# Patient Record
Sex: FEMALE | Race: WHITE | Employment: FULL TIME | URBAN - METROPOLITAN AREA
[De-identification: names, ages, dates, MRNs, and addresses within clinical notes are randomized per-mention and may not be internally consistent; named-entity substitution may affect disease eponyms.]

---

## 2022-06-03 ENCOUNTER — HOSPITAL ENCOUNTER (OUTPATIENT)
Dept: GENERAL RADIOLOGY | Age: 32
Discharge: HOME | End: 2022-06-06

## 2022-06-03 DIAGNOSIS — T14.90XA INJURY: ICD-10-CM

## 2022-06-03 PROCEDURE — 73130 X-RAY EXAM OF HAND: CPT

## 2022-06-13 ENCOUNTER — OFFICE VISIT (OUTPATIENT)
Dept: ORTHOPEDIC SURGERY | Age: 32
End: 2022-06-13

## 2022-06-13 ENCOUNTER — TELEPHONE (OUTPATIENT)
Dept: ORTHOPEDIC SURGERY | Age: 32
End: 2022-06-13

## 2022-06-13 VITALS — BODY MASS INDEX: 22.15 KG/M2 | HEIGHT: 63 IN | WEIGHT: 125 LBS

## 2022-06-13 DIAGNOSIS — S62.662A CLOSED NONDISPLACED FRACTURE OF DISTAL PHALANX OF RIGHT MIDDLE FINGER, INITIAL ENCOUNTER: Primary | ICD-10-CM

## 2022-06-13 DIAGNOSIS — S62.664A NONDISPLACED FRACTURE OF DISTAL PHALANX OF RIGHT RING FINGER, INITIAL ENCOUNTER FOR CLOSED FRACTURE: ICD-10-CM

## 2022-06-13 DIAGNOSIS — L03.011 PARONYCHIA OF FINGER OF RIGHT HAND: ICD-10-CM

## 2022-06-13 RX ORDER — CEPHALEXIN 500 MG/1
500 CAPSULE ORAL 4 TIMES DAILY
Qty: 40 CAPSULE | Refills: 0 | Status: SHIPPED | OUTPATIENT
Start: 2022-06-13 | End: 2022-06-23

## 2022-06-13 NOTE — PROGRESS NOTES
Orthopaedic Hand Clinic Note    Name: Shana Mann  YOB: 1990  Gender: female  MRN: [id number]      CC: Patient referred for evaluation of upper extremity pain    HPI: Shana Mann is a 27 y.o. female with a chief complaint of right middle and ring finger pain after sustaining a crushing injury to the middle and ring fingers on 6/3/2022 at work. She was evaluated in urgent care, and had x-rays obtained. She had discontinue the use of her splints and went straight back to work, however on Saturday in the middle of the night she began having increasing pain in the ring finger, and noticed that there was pus coming out from underneath the nail. .      ROS/Meds/PSH/PMH/FH/SH: I personally reviewed the patients standard intake form. Pertinents are discussed in the HPI    Physical Examination:    Musculoskeletal Exam:  Examination on the right upper extremity demonstrates cap refill < 5 seconds in all fingers, skin is intact there is tenderness to palpation at the distal phalanx of the middle and ring fingers. There is no subungual hematoma at the middle finger. Nail plate is intact. On exam of the ring finger there is obvious purulence underneath the nail plate, proximal 05% of the nail plate, there is mild erythema of the paronychium; there is no fluctuance. Imaging / Electrodiagnostic Tests: Only reviewed and interpreted radiographs of the right hand. They demonstrate nondisplaced fractures of the distal phalanx bases of the middle and ring fingers    Assessment:     ICD-10-CM    1. Felon of finger of right hand  L03.011 cephALEXin (KEFLEX) 500 MG capsule   2. Closed nondisplaced fracture of distal phalanx of right middle finger, initial encounter  S62.662A    3. Nondisplaced fracture of distal phalanx of right ring finger, initial encounter for closed fracture  N93.455O        Plan:   We discussed the diagnosis and different treatment options.  We discussed observation, therapy, antiinflammatory medications and other pertinent treatment modalities. After discussing in detail the patient elects to proceed with nail plate removal to allow debridement of the purulence under her ring nail plate. Performed a digital block with 1% lidocaine without epinephrine. After allowing this to set up for several minutes and adequate anesthesia was obtained, under sterile conditions I removed the nail plate with blunt dissection. There was a small amount of purulence underneath it. The nail bed was in good condition. The area was irrigated and a sterile dressing was applied. I've given her prescription for Keflex. She is to perform soap and water washes 2-3 times daily. He is to avoid exposures to dirty water. She can perform finger motion as tolerated. I will see her back in 1 week for wound check. I discussed this with her Worker's Comp. nurse     Patient voiced accordance and understanding of the information provided and the formulated plan. All questions were answered to the patient's satisfaction during the encounter.       Hari Mike MD  Orthopaedic Surgery  06/13/22  5:53 PM

## 2022-06-13 NOTE — TELEPHONE ENCOUNTER
Nurse  calling to say her finger is draining and running a fever.  Feels like someone should check it today even though she has an appt tomoroow

## 2022-06-13 NOTE — LETTER
[address]  [facility] - [address] [address]  Phone: [phone]  Fax: [phone]          June 13, 2022     Patient: Young Belts   YOB: 1990   Date of Visit: 6/13/2022       To Whom It May Concern: It is my medical opinion that Young Belts may return to work with no exposure to water with the right hand for 1 week. If you have any questions or concerns, please don't hesitate to call.     Sincerely,        Richard Ledezma MD

## 2022-06-15 ENCOUNTER — TELEPHONE (OUTPATIENT)
Dept: ORTHOPEDIC SURGERY | Age: 32
End: 2022-06-15

## 2022-06-15 DIAGNOSIS — S62.662A CLOSED NONDISPLACED FRACTURE OF DISTAL PHALANX OF RIGHT MIDDLE FINGER, INITIAL ENCOUNTER: Primary | ICD-10-CM

## 2022-06-15 RX ORDER — IBUPROFEN 800 MG/1
800 TABLET ORAL 3 TIMES DAILY PRN
Qty: 21 TABLET | Refills: 0 | Status: SHIPPED | OUTPATIENT
Start: 2022-06-15 | End: 2022-06-15 | Stop reason: ALTCHOICE

## 2022-06-15 RX ORDER — NAPROXEN 500 MG/1
500 TABLET ORAL 2 TIMES DAILY PRN
Qty: 14 TABLET | Refills: 0 | Status: SHIPPED | OUTPATIENT
Start: 2022-06-15 | End: 2022-06-22

## 2022-06-15 NOTE — TELEPHONE ENCOUNTER
I left  for patient's mom stating Dr. Willie Nelson does not prescribe narcotic pain medication for this type of procedure. Per Dr. Willie Nelson she can have the naproxen that we will send in to her pharmacy or the ibuprofen 800 mg. Advised her to call me back with any questions.

## 2022-06-15 NOTE — TELEPHONE ENCOUNTER
Her mom is calling again. She was seen on Monday and had an infection in her wound so her fingernail was removed and the infection was scraped out. Please call her mom at [phone] as soon as possible. She is in a lot of pain and needs to discuss this.

## 2022-06-15 NOTE — TELEPHONE ENCOUNTER
I spoke with patient, she states nothing is helping her pain. She's tried ibuprofen, tylenol, ice, heat, elevating, etc.  States her finger is throbbing constantly. I advised her I would ask Dr. Chelsey Man and call her back. She voiced understanding.

## 2022-06-15 NOTE — TELEPHONE ENCOUNTER
Per Dr. Isaias sanders to send in ibuprofen 800 mg TID for 7 days. I notified patient's mother at patient's request.  Mother states she's been alternating 800 mg ibuprofen and tylenol consistently and it hasn't helped. I advised her I would contact Dr. Isaias Oliveira and call her back.

## 2022-06-16 ENCOUNTER — TELEPHONE (OUTPATIENT)
Dept: ORTHOPEDIC SURGERY | Age: 32
End: 2022-06-16

## 2022-06-16 ENCOUNTER — OFFICE VISIT (OUTPATIENT)
Dept: ORTHOPEDIC SURGERY | Age: 32
End: 2022-06-16

## 2022-06-16 DIAGNOSIS — S62.664A NONDISPLACED FRACTURE OF DISTAL PHALANX OF RIGHT RING FINGER, INITIAL ENCOUNTER FOR CLOSED FRACTURE: ICD-10-CM

## 2022-06-16 DIAGNOSIS — S62.662A CLOSED NONDISPLACED FRACTURE OF DISTAL PHALANX OF RIGHT MIDDLE FINGER, INITIAL ENCOUNTER: Primary | ICD-10-CM

## 2022-06-16 NOTE — TELEPHONE ENCOUNTER
I tried returning patient's phone call in regards to an appointment today but got no answer on the willie # listed and the mailbox is full and the cell number stated it is no longer in service.

## 2022-06-20 ENCOUNTER — OFFICE VISIT (OUTPATIENT)
Dept: ORTHOPEDIC SURGERY | Age: 32
End: 2022-06-20

## 2022-06-20 DIAGNOSIS — S62.662A CLOSED NONDISPLACED FRACTURE OF DISTAL PHALANX OF RIGHT MIDDLE FINGER, INITIAL ENCOUNTER: Primary | ICD-10-CM

## 2022-06-20 DIAGNOSIS — S62.664A NONDISPLACED FRACTURE OF DISTAL PHALANX OF RIGHT RING FINGER, INITIAL ENCOUNTER FOR CLOSED FRACTURE: ICD-10-CM

## 2022-06-20 NOTE — PROGRESS NOTES
Orthopaedic Hand Clinic Note    Name: Tato Lopes  YOB: 1990  Gender: female  MRN: [id number]      Follow up visit:   1. Closed nondisplaced fracture of distal phalanx of right middle finger, initial encounter    2. Nondisplaced fracture of distal phalanx of right ring finger, initial encounter for closed fracture        HPI: Tato Lopes is a 27 y.o. female who is following up for injuries to the right middle and ring finger. She states that the ring finger is still painful, but it is improving. She has been back at work, and has been avoiding exposure to water      ROS/Meds/PSH/PMH/FH/SH: I personally reviewed the patients standard intake form. Pertinents are discussed in the HPI    Physical Examination:    Musculoskeletal Examination:  Examination on the right upper extremity demonstrates cap refill < 5 seconds in all fingers, skin is intact, nail plate is intact at the middle finger, with no subungual hematoma. She has mild tenderness to palpation of the middle finger distal phalanx. She has moderate tenderness at the ring finger distal phalanx. Nail plate has been removed, and nailbed is in good condition. There is no active drainage. There is nosurrounding erythema. Imaging / Electrodiagnostic Tests:     none    Assessment:     ICD-10-CM    1. Closed nondisplaced fracture of distal phalanx of right middle finger, initial encounter  S62.662A    2. Nondisplaced fracture of distal phalanx of right ring finger, initial encounter for closed fracture  O82.220G        Plan:   We discussed the diagnosis and different treatment options. We discussed observation, therapy, antiinflammatory medications and other pertinent treatment modalities. After discussing in detail the patient elects to proceed with sterile anti-inflammatories or Tylenol as needed for pain. She can continue to work, but should avoid exposure to water at work for an additional week.   Following this, she can resume full duty with no restrictions. She will follow up in 3 months, or sooner if any worsening of symptoms. I discussed this with her Worker's Compensation nurse . Patient voiced accordance and understanding of the information provided and the formulated plan. All questions were answered to the patient's satisfaction during the encounter.       Jono Tan MD  Orthopaedic Surgery  06/20/22  12:29 PM

## 2022-06-20 NOTE — LETTER
[address] [address]  Phone: [phone]  Fax: [phone]    Carmenza Viveros MD        June 20, 2022     Patient: Tata Hopkins   YOB: 1990   Date of Visit: 6/20/2022       To Whom It May Concern: It is my medical opinion that Tata Hopkins may return to work with no exposure to water with the right hand for 1 week. Following that, she can resume all activities with no restrictions    If you have any questions or concerns, please don't hesitate to call.     Sincerely,        Carmenza Viveros MD

## 2022-09-13 ENCOUNTER — APPOINTMENT (OUTPATIENT)
Dept: ULTRASOUND IMAGING | Age: 32
End: 2022-09-13
Payer: MEDICAID

## 2022-09-13 ENCOUNTER — HOSPITAL ENCOUNTER (EMERGENCY)
Age: 32
Discharge: HOME | End: 2022-09-13
Attending: EMERGENCY MEDICINE
Payer: MEDICAID

## 2022-09-13 VITALS
HEART RATE: 74 BPM | DIASTOLIC BLOOD PRESSURE: 77 MMHG | WEIGHT: 126 LBS | BODY MASS INDEX: 22.32 KG/M2 | SYSTOLIC BLOOD PRESSURE: 120 MMHG | TEMPERATURE: 98.3 F | OXYGEN SATURATION: 99 % | HEIGHT: 63 IN | RESPIRATION RATE: 16 BRPM

## 2022-09-13 DIAGNOSIS — Z71.1 FEARED CONDITION NOT DEMONSTRATED: ICD-10-CM

## 2022-09-13 DIAGNOSIS — R10.31 ABDOMINAL PAIN, RIGHT LOWER QUADRANT: Primary | ICD-10-CM

## 2022-09-13 DIAGNOSIS — O23.42 URINARY TRACT INFECTION IN MOTHER DURING SECOND TRIMESTER OF PREGNANCY: ICD-10-CM

## 2022-09-13 LAB
ALBUMIN SERPL-MCNC: 3.7 G/DL (ref 3.5–5)
ALBUMIN/GLOB SERPL: 1.1 {RATIO}
ALP SERPL-CCNC: 65 U/L (ref 45–117)
ALT SERPL-CCNC: 11 U/L (ref 13–61)
ANION GAP SERPL CALC-SCNC: 10 MMOL/L (ref 7–16)
APPEARANCE UR: ABNORMAL
AST SERPL-CCNC: 14 U/L (ref 15–37)
BACTERIA URNS QL MICRO: ABNORMAL /HPF
BASOPHILS # BLD: 0 K/UL (ref 0–0.2)
BASOPHILS NFR BLD: 0 % (ref 0–2)
BILIRUB SERPL-MCNC: 0.2 MG/DL (ref 0.2–1.1)
BILIRUB UR QL: NEGATIVE
BUN SERPL-MCNC: 9 MG/DL (ref 7–18)
CALCIUM SERPL-MCNC: 8.8 MG/DL (ref 8.3–10.4)
CASTS URNS QL MICRO: 0 /LPF
CHLORIDE SERPL-SCNC: 106 MMOL/L (ref 98–107)
CO2 SERPL-SCNC: 25 MMOL/L (ref 21–32)
COLOR UR: YELLOW
CREAT SERPL-MCNC: 0.49 MG/DL (ref 0.6–1)
CRYSTALS URNS QL MICRO: 0 /LPF
DIFFERENTIAL METHOD BLD: ABNORMAL
EOSINOPHIL # BLD: 0.1 K/UL (ref 0–0.8)
EOSINOPHIL NFR BLD: 1 % (ref 0.5–7.8)
EPI CELLS #/AREA URNS HPF: ABNORMAL /HPF
ERYTHROCYTE [DISTWIDTH] IN BLOOD BY AUTOMATED COUNT: 12.4 % (ref 11.9–14.6)
GLOBULIN SER CALC-MCNC: 3.3 G/DL (ref 2.3–3.5)
GLUCOSE SERPL-MCNC: 87 MG/DL (ref 65–100)
GLUCOSE UR STRIP.AUTO-MCNC: NEGATIVE MG/DL
HCT VFR BLD AUTO: 35.8 % (ref 35.8–46.3)
HGB BLD-MCNC: 12.2 G/DL (ref 11.7–15.4)
HGB UR QL STRIP: NEGATIVE
IMM GRANULOCYTES # BLD AUTO: 0 K/UL (ref 0–0.5)
IMM GRANULOCYTES NFR BLD AUTO: 0 % (ref 0–5)
KETONES UR QL STRIP.AUTO: NEGATIVE MG/DL
LEUKOCYTE ESTERASE UR QL STRIP.AUTO: ABNORMAL
LIPASE SERPL-CCNC: 41 U/L (ref 13–60)
LYMPHOCYTES # BLD: 1.6 K/UL (ref 0.5–4.6)
LYMPHOCYTES NFR BLD: 17 % (ref 13–44)
MCH RBC QN AUTO: 31.9 PG (ref 26.1–32.9)
MCHC RBC AUTO-ENTMCNC: 34.1 G/DL (ref 31.4–35)
MCV RBC AUTO: 93.5 FL (ref 79.6–97.8)
MONOCYTES # BLD: 0.6 K/UL (ref 0.1–1.3)
MONOCYTES NFR BLD: 6 % (ref 4–12)
MUCOUS THREADS URNS QL MICRO: 0 /LPF
NEUTS SEG # BLD: 7.3 K/UL (ref 1.7–8.2)
NEUTS SEG NFR BLD: 77 % (ref 43–78)
NITRITE UR QL STRIP.AUTO: NEGATIVE
NRBC # BLD: 0 K/UL (ref 0–0.2)
OTHER OBSERVATIONS: ABNORMAL
PH UR STRIP: 5.5 [PH] (ref 5–9)
PLATELET # BLD AUTO: 291 K/UL (ref 150–450)
PMV BLD AUTO: 10.2 FL (ref 9.4–12.3)
POTASSIUM SERPL-SCNC: 3.9 MMOL/L (ref 3.5–5.1)
PROT SERPL-MCNC: 7 G/DL (ref 6.4–8.2)
PROT UR STRIP-MCNC: NEGATIVE MG/DL
RBC # BLD AUTO: 3.83 M/UL (ref 4.05–5.2)
RBC #/AREA URNS HPF: ABNORMAL /HPF
SODIUM SERPL-SCNC: 141 MMOL/L (ref 136–145)
SP GR UR REFRACTOMETRY: >=1.03 (ref 1–1.02)
UROBILINOGEN UR QL STRIP.AUTO: 0.2 EU/DL (ref 0.2–1)
WBC # BLD AUTO: 9.5 K/UL (ref 4.3–11.1)
WBC URNS QL MICRO: ABNORMAL /HPF

## 2022-09-13 PROCEDURE — 83690 ASSAY OF LIPASE: CPT

## 2022-09-13 PROCEDURE — 85025 COMPLETE CBC W/AUTO DIFF WBC: CPT

## 2022-09-13 PROCEDURE — 81003 URINALYSIS AUTO W/O SCOPE: CPT

## 2022-09-13 PROCEDURE — 81001 URINALYSIS AUTO W/SCOPE: CPT

## 2022-09-13 PROCEDURE — 80053 COMPREHEN METABOLIC PANEL: CPT

## 2022-09-13 PROCEDURE — 99283 EMERGENCY DEPT VISIT LOW MDM: CPT

## 2022-09-13 PROCEDURE — 76705 ECHO EXAM OF ABDOMEN: CPT

## 2022-09-13 PROCEDURE — 6370000000 HC RX 637 (ALT 250 FOR IP): Performed by: EMERGENCY MEDICINE

## 2022-09-13 RX ORDER — CYCLOBENZAPRINE HCL 10 MG
10 TABLET ORAL
Status: COMPLETED | OUTPATIENT
Start: 2022-09-13 | End: 2022-09-13

## 2022-09-13 RX ORDER — CYCLOBENZAPRINE HCL 5 MG
5 TABLET ORAL NIGHTLY PRN
Qty: 10 TABLET | Refills: 0 | Status: SHIPPED | OUTPATIENT
Start: 2022-09-13 | End: 2022-09-23

## 2022-09-13 RX ORDER — CEPHALEXIN 500 MG/1
500 CAPSULE ORAL 2 TIMES DAILY
Qty: 14 CAPSULE | Refills: 0 | Status: SHIPPED | OUTPATIENT
Start: 2022-09-13 | End: 2022-09-20

## 2022-09-13 RX ORDER — ACETAMINOPHEN 325 MG/1
650 TABLET ORAL
Status: COMPLETED | OUTPATIENT
Start: 2022-09-13 | End: 2022-09-13

## 2022-09-13 RX ADMIN — CYCLOBENZAPRINE 10 MG: 10 TABLET, FILM COATED ORAL at 17:29

## 2022-09-13 RX ADMIN — ACETAMINOPHEN 650 MG: 325 TABLET ORAL at 17:29

## 2022-09-13 ASSESSMENT — PAIN DESCRIPTION - ORIENTATION
ORIENTATION: LEFT;RIGHT;LOWER
ORIENTATION: RIGHT;LEFT;LOWER

## 2022-09-13 ASSESSMENT — PAIN DESCRIPTION - FREQUENCY
FREQUENCY: CONTINUOUS
FREQUENCY: CONTINUOUS

## 2022-09-13 ASSESSMENT — ENCOUNTER SYMPTOMS
NAUSEA: 1
ABDOMINAL PAIN: 1
VOMITING: 1

## 2022-09-13 ASSESSMENT — PAIN - FUNCTIONAL ASSESSMENT
PAIN_FUNCTIONAL_ASSESSMENT: 0-10
PAIN_FUNCTIONAL_ASSESSMENT: 0-10

## 2022-09-13 ASSESSMENT — PAIN SCALES - GENERAL
PAINLEVEL_OUTOF10: 5
PAINLEVEL_OUTOF10: 8
PAINLEVEL_OUTOF10: 5
PAINLEVEL_OUTOF10: 8
PAINLEVEL_OUTOF10: 8

## 2022-09-13 ASSESSMENT — PAIN DESCRIPTION - DESCRIPTORS
DESCRIPTORS: SHARP
DESCRIPTORS: ACHING
DESCRIPTORS: STABBING;SHARP

## 2022-09-13 ASSESSMENT — PAIN DESCRIPTION - PAIN TYPE
TYPE: ACUTE PAIN
TYPE: ACUTE PAIN

## 2022-09-13 ASSESSMENT — PAIN DESCRIPTION - LOCATION
LOCATION: ABDOMEN
LOCATION: ABDOMEN
LOCATION: ABDOMEN;BACK
LOCATION: ABDOMEN
LOCATION: ABDOMEN

## 2022-09-13 NOTE — ED PROVIDER NOTES
Emergency Department Provider Note                   PCP:                Irma Davis MD               Age: 27 y.o. Sex: female       ICD-10-CM    1. Abdominal pain, right lower quadrant  R10.31       2. Urinary tract infection in mother during second trimester of pregnancy  O23.42       3. Feared condition not demonstrated  Z71.1           DISPOSITION Decision To Discharge 09/13/2022 07:01:52 PM       MDM  Number of Diagnoses or Management Options  Abdominal pain, right lower quadrant  Feared condition not demonstrated  Urinary tract infection in mother during second trimester of pregnancy  Diagnosis management comments: Viral infection, gastroenteritis, viral adenitis, pseudomembranous colitis, inflammatory  bowel disease, infectious diarrhea    Abdominal wall pain,     Constipation, fecal impaction, small bowel obstruction, partial small bowel obstruction,  Ileus    gallbladder disease, cholecystitis, diverticulitis, appendicitis, appendicitis with rupture,    UTI, pyelonephritis, renal colic, ureteral stone     Peptic ulcer disease, esophagitis, GERD           Amount and/or Complexity of Data Reviewed  Clinical lab tests: ordered and reviewed  Tests in the radiology section of CPT®: ordered and reviewed  Tests in the medicine section of CPT®: reviewed and ordered  Review and summarize past medical records: yes  Independent visualization of images, tracings, or specimens: yes         Orders Placed This Encounter   Procedures    US ABDOMEN LIMITED Specify organ?  APPENDIX    CBC with Diff    CMP    Lipase    Urinalysis w rflx microscopic    Urinalysis, Micro    Diet NPO    Saline lock IV        Medications   cyclobenzaprine (FLEXERIL) tablet 10 mg (10 mg Oral Given 9/13/22 1729)   acetaminophen (TYLENOL) tablet 650 mg (650 mg Oral Given 9/13/22 1729)       New Prescriptions    CEPHALEXIN (KEFLEX) 500 MG CAPSULE    Take 1 capsule by mouth 2 times daily for 7 days    CYCLOBENZAPRINE (FLEXERIL) 5 MG TABLET    Take 1 tablet by mouth nightly as needed for Muscle spasms        Selvin Barlow is a 27 y.o. female who presents to the Emergency Department with chief complaint of    Chief Complaint   Patient presents with    Abdominal Pain      Patient is a 26-year-old female presenting to the emergency department today complaining of right lower quadrant abdominal pain which started on Thursday and progressively worsened over the last few days. Patient was seen and evaluated at her OBs office yesterday and reading her note it says that she thought that the pain was related to her pregnancy however the patient states that she was told to go to the emergency department at [address] for rule out appendicitis. Patient went but said that the wait was 12 hours and did not want to stay there that long and decided to leave. The pain has continued and she decided to come here today for further evaluation. She has had nausea increased over the last few days and is taking Zofran and Phenergan with control of her symptoms. All other systems reviewed and are negative unless otherwise stated in the history of present illness section. Review of Systems   Gastrointestinal:  Positive for abdominal pain, nausea and vomiting. All other systems reviewed and are negative. Past Medical History:   Diagnosis Date    Anxiety     Crohn disease ([facility] [address]     Depression     Headache         Past Surgical History:   Procedure Laterality Date     SECTION      CHOLECYSTECTOMY      TONSILLECTOMY          History reviewed. No pertinent family history.      Social History     Socioeconomic History    Marital status: Single     Spouse name: None    Number of children: None    Years of education: None    Highest education level: None   Tobacco Use    Smoking status: Never    Smokeless tobacco: Never   Vaping Use    Vaping Use: Never used   Substance and Sexual Activity    Alcohol use: Not Currently    Drug use: Never Allergies: Ciprofloxacin, Aloe vera, Clindamycin, and Prednisone    Previous Medications    ACETAMINOPHEN (TYLENOL) 325 MG TABLET    Take 1 or 2 tablets every 4 hours as needed for mild pain    NAPROXEN (NAPROSYN) 500 MG TABLET    Take 1 tablet by mouth 2 times daily as needed for Pain    SUMATRIPTAN (IMITREX) 100 MG TABLET    once as needed         Vitals signs and nursing note reviewed. Patient Vitals for the past 4 hrs:   Temp Pulse Resp BP SpO2   09/13/22 1702 98.4 °F (36.9 °C) 83 20 117/79 99 %          Physical Exam     GENERAL:The patient has Body mass index is 22.32 kg/m². Well-hydrated. VITAL SIGNS: Heart rate, blood pressure, respiratory rate reviewed as recorded in  nurse's notes  EYES: Pupils reactive. Extraocular motion intact. No conjunctival redness or drainage. MOUTH/THROAT: Pharynx clear; airway patent. NECK: Supple, no meningeal signs. Trachea midline. No masses or thyromegaly. LUNGS: Breath sounds clear and equal bilaterally no accessory muscle use. CHEST: No deformity  CARDIOVASCULAR: Regular rate and rhythm  ABDOMEN: Soft with right lower quadrant tenderness. Positive heeltap and psoas sign. No palpable masses or organomegaly. No peritoneal signs. No rigidity. Negative Megan Nasuti, McBurney and Rovsing signs. EXTREMITIES: No clubbing or cyanosis. No joint swelling. Normal muscle tone. No  restricted range of motion appreciated. NEUROLOGIC: Sensation is grossly intact. Cranial nerve exam reveals face is  symmetrical, tongue is midline speech is clear. SKIN: No rash or petechiae. Good skin turgor palpated. PSYCHIATRIC: Alert and oriented. Appropriate behavior and judgment. Procedures      Results for orders placed or performed during the hospital encounter of 09/13/22    [address] organ? APPENDIX    Narrative    LIMITED ABDOMINAL ULTRASOUND 9/13/2022    HISTORY: Pregnant patient with right lower quadrant pain.     TECHNIQUE: Sonographic imaging of the right lower quadrant was performed. FINDINGS: The appendix is not visible. There is no visible free fluid in the  imaged portion of the right lower abdomen. A fetal heart rate measures 151 bpm.      Impression    This is equivocal for evaluation of appendicitis in the absence of  an identifiable appendix in the right lower quadrant.    CBC with Diff   Result Value Ref Range    WBC 9.5 4.3 - 11.1 K/uL    RBC 3.83 (L) 4.05 - 5.20 M/uL    Hemoglobin 12.2 11.7 - 15.4 g/dL    Hematocrit 35.8 35.8 - 46.3 %    MCV 93.5 79.6 - 97.8 FL    MCH 31.9 26.1 - 32.9 PG    MCHC 34.1 31.4 - 35.0 g/dL    RDW 12.4 11.9 - 14.6 %    Platelets 632 773 - 882 K/uL    MPV 10.2 9.4 - 12.3 FL    nRBC 0.00 0.0 - 0.2 K/uL    Differential Type AUTOMATED      Seg Neutrophils 77 43 - 78 %    Lymphocytes 17 13 - 44 %    Monocytes 6 4.0 - 12.0 %    Eosinophils % 1 0.5 - 7.8 %    Basophils 0 0.0 - 2.0 %    Immature Granulocytes 0 0.0 - 5.0 %    Segs Absolute 7.3 1.7 - 8.2 K/UL    Absolute Lymph # 1.6 0.5 - 4.6 K/UL    Absolute Mono # 0.6 0.1 - 1.3 K/UL    Absolute Eos # 0.1 0.0 - 0.8 K/UL    Basophils Absolute 0.0 0.0 - 0.2 K/UL    Absolute Immature Granulocyte 0.0 0.0 - 0.5 K/UL   CMP   Result Value Ref Range    Sodium 141 136 - 145 mmol/L    Potassium 3.9 3.5 - 5.1 mmol/L    Chloride 106 98 - 107 mmol/L    CO2 25 21 - 32 mmol/L    Anion Gap 10 7.0 - 16.0 mmol/L    Glucose 87 65 - 100 mg/dL    BUN 9 7.0 - 18.0 MG/DL    Creatinine 0.49 (L) 0.6 - 1.0 MG/DL    GFR African American >186 >60 ml/min/1.73m2    GFR Non- >60 >60 ml/min/1.73m2    Calcium 8.8 8.3 - 10.4 MG/DL    Total Bilirubin 0.2 0.2 - 1.1 MG/DL    ALT 11 (L) 13.0 - 61.0 U/L    AST 14 (L) 15 - 37 U/L    Alk Phosphatase 65 45.0 - 117.0 U/L    Total Protein 7.0 6.4 - 8.2 g/dL    Albumin 3.7 3.5 - 5.0 g/dL    Globulin 3.3 2.3 - 3.5 g/dL    Albumin/Globulin Ratio 1.1     Lipase   Result Value Ref Range    Lipase 41 13 - 60 U/L   Urinalysis w rflx microscopic   Result Value Ref Range Color, UA YELLOW      Appearance SLIGHTLY CLOUDY      Specific Gravity, UA >=1.030 1.001 - 1.023    pH, Urine 5.5 5.0 - 9.0      Protein, UA Negative NEG mg/dL    Glucose, UA Negative mg/dL    Ketones, Urine Negative NEG mg/dL    Bilirubin Urine Negative NEG      Blood, Urine Negative NEG      Urobilinogen, Urine 0.2 0.2 - 1.0 EU/dL    Nitrite, Urine Negative NEG      Leukocyte Esterase, Urine TRACE (A) NEG     Urinalysis, Micro   Result Value Ref Range    WBC, UA 20-50 0 /hpf    RBC, UA 3-5 0 /hpf    Epithelial Cells UA 0-3 0 /hpf    BACTERIA, URINE 1+ (H) 0 /hpf    Casts 0 0 /lpf    Crystals 0 0 /LPF    Mucus, UA 0 0 /lpf    OTHER OBSERVATIONS RESULTS VERIFIED MANUALLY          US ABDOMEN LIMITED Specify organ? APPENDIX   Final Result   This is equivocal for evaluation of appendicitis in the absence of   an identifiable appendix in the right lower quadrant. ED Course as of 09/13/22 1903   Tue Sep 13, 2022   [facility] organ? APPENDIX  IMPRESSION:  This is equivocal for evaluation of appendicitis in the absence of  an identifiable appendix in the right lower quadrant. [KH]      ED Course User Index  [KH] Monae Lovett DO        Voice dictation software was used during the making of this note. This software is not perfect and grammatical and other typographical errors may be present. This note has not been completely proofread for errors.        Monae Lovett DO  09/13/22 1903

## 2022-09-13 NOTE — DISCHARGE INSTRUCTIONS
Take the full course of antibiotics as prescribed. Take the Flexeril at night before going to bed and use Tylenol as needed for mild to moderate pain control. Follow-up with your OB in 2 to 3 days for repeat evaluation.   If you develop new or concerning symptoms return to the ER at that time

## 2022-09-13 NOTE — ED NOTES
I have reviewed discharge instructions with the patient. The patient verbalized understanding. Patient left ED via Discharge Method: ambulatory to Home with self. Opportunity for questions and clarification provided. Patient given 2 scripts. Work note provided    To continue your aftercare when you leave the hospital, you may receive an automated call from our care team to check in on how you are doing. This is a free service and part of our promise to provide the best care and service to meet your aftercare needs.  If you have questions, or wish to unsubscribe from this service please call [phone]. Thank you for Choosing our [facility] Emergency Department.       Sena Galindo RN  09/13/22 5490

## 2022-09-13 NOTE — ED TRIAGE NOTES
Patient states he has Lower abd pain and left flank pain. Started on Saturday. C/o N/V, denies diarrhea, fever. Patient states she went to her OBGYN and they sent her to [address] [place] [address] thinking she had appendicitis. Patient reports the wait was too long so she came her. Denies dysuria, Urgency, frequency. Abd is tender.

## 2022-10-31 ENCOUNTER — OFFICE VISIT (OUTPATIENT)
Dept: ORTHOPEDIC SURGERY | Age: 32
End: 2022-10-31

## 2022-10-31 VITALS — WEIGHT: 130 LBS | BODY MASS INDEX: 23.04 KG/M2 | HEIGHT: 63 IN

## 2022-10-31 DIAGNOSIS — S62.662A CLOSED NONDISPLACED FRACTURE OF DISTAL PHALANX OF RIGHT MIDDLE FINGER, INITIAL ENCOUNTER: Primary | ICD-10-CM

## 2022-10-31 DIAGNOSIS — S62.664A NONDISPLACED FRACTURE OF DISTAL PHALANX OF RIGHT RING FINGER, INITIAL ENCOUNTER FOR CLOSED FRACTURE: ICD-10-CM

## 2022-10-31 NOTE — LETTER
[address] [address]  Phone: [phone]  Fax: [phone]    Kalen Motley MD        October 31, 2022     Patient: Carl Berg   YOB: 1990   Date of Visit: 10/31/2022       To Whom It May Concern: It is my medical opinion that Carl Berg may return to full duty immediately with no restrictions. She has reached maximum medical improvement    If you have any questions or concerns, please don't hesitate to call.     Sincerely,        Kalen Motley MD

## 2022-10-31 NOTE — PROGRESS NOTES
Orthopaedic Hand Clinic Note    Name: Juan Luis Ríos  YOB: 1990  Gender: female  MRN: [id number]      Follow up visit:   1. Closed nondisplaced fracture of distal phalanx of right middle finger, initial encounter    2. Nondisplaced fracture of distal phalanx of right ring finger, initial encounter for closed fracture        HPI: JuanL uis Ríos is a 27 y.o. female who is following up for injury to her right middle and ring finger. She has no pain, and no difficulty using the fingers. She has no new complaints today. ROS/Meds/PSH/PMH/FH/SH: I personally reviewed the patients standard intake form. Pertinents are discussed in the HPI    Physical Examination:    Musculoskeletal Examination:  Examination on the right upper extremity demonstrates cap refill < 5 seconds in all fingers, skin is intact, there is no soft tissue swelling. Nail plates are intact without deformity. She is able to perform full extension at the middle and ring distal interphalangeal joints, and she is able to make a composite fist to distal palmar crease. She has no tenderness to palpation or pain with range of motion. Light touch sensation is intact throughout. Imaging / Electrodiagnostic Tests:     none    Assessment:     ICD-10-CM    1. Closed nondisplaced fracture of distal phalanx of right middle finger, initial encounter  S62.662A       2. Nondisplaced fracture of distal phalanx of right ring finger, initial encounter for closed fracture  Q61.992I           Plan:   We discussed the diagnosis and different treatment options. We discussed observation, therapy, antiinflammatory medications and other pertinent treatment modalities. After discussing in detail the patient elects to proceed with continue all activities as tolerated. I have no activity restrictions for her. She has achieved maximal medical improvement. . I have discussed this with her medical case manager, Sherren Razor, who is here with her today    Patient voiced accordance and understanding of the information provided and the formulated plan. All questions were answered to the patient's satisfaction during the encounter.       Ashley Waddell MD  Orthopaedic Surgery  10/31/22  11:03 AM

## 2022-11-18 ENCOUNTER — CLINICAL DOCUMENTATION (OUTPATIENT)
Dept: ORTHOPEDIC SURGERY | Age: 32
End: 2022-11-18

## 2022-12-20 ENCOUNTER — HOSPITAL ENCOUNTER (EMERGENCY)
Age: 32
Discharge: ANOTHER HOSPITAL | End: 2022-12-20
Attending: EMERGENCY MEDICINE
Payer: MEDICAID

## 2022-12-20 VITALS
RESPIRATION RATE: 18 BRPM | BODY MASS INDEX: 23.03 KG/M2 | SYSTOLIC BLOOD PRESSURE: 115 MMHG | DIASTOLIC BLOOD PRESSURE: 73 MMHG | OXYGEN SATURATION: 99 % | HEIGHT: 63 IN | HEART RATE: 103 BPM | TEMPERATURE: 97.6 F

## 2022-12-20 DIAGNOSIS — U07.1 COVID: Primary | ICD-10-CM

## 2022-12-20 DIAGNOSIS — R10.2 PELVIC PRESSURE IN PREGNANCY: ICD-10-CM

## 2022-12-20 DIAGNOSIS — O26.899 PELVIC PRESSURE IN PREGNANCY: ICD-10-CM

## 2022-12-20 LAB
ALBUMIN SERPL-MCNC: 3.5 G/DL (ref 3.5–5)
ALBUMIN/GLOB SERPL: 1.1 {RATIO} (ref 0.4–1.6)
ALP SERPL-CCNC: 84 U/L (ref 45–117)
ALT SERPL-CCNC: 15 U/L (ref 13–61)
ANION GAP SERPL CALC-SCNC: 12 MMOL/L (ref 2–11)
AST SERPL-CCNC: 18 U/L (ref 15–37)
BASOPHILS # BLD: 0 K/UL (ref 0–0.2)
BASOPHILS NFR BLD: 0 % (ref 0–2)
BILIRUB SERPL-MCNC: 0.2 MG/DL (ref 0.2–1.1)
BUN SERPL-MCNC: 5 MG/DL (ref 7–18)
CALCIUM SERPL-MCNC: 8.8 MG/DL (ref 8.3–10.4)
CHLORIDE SERPL-SCNC: 101 MMOL/L (ref 98–107)
CO2 SERPL-SCNC: 23 MMOL/L (ref 21–32)
CREAT SERPL-MCNC: 0.51 MG/DL (ref 0.6–1)
DIFFERENTIAL METHOD BLD: ABNORMAL
EOSINOPHIL # BLD: 0 K/UL (ref 0–0.8)
EOSINOPHIL NFR BLD: 1 % (ref 0.5–7.8)
ERYTHROCYTE [DISTWIDTH] IN BLOOD BY AUTOMATED COUNT: 12 % (ref 11.9–14.6)
FLUAV AG NPH QL IA: NEGATIVE
FLUBV AG NPH QL IA: NEGATIVE
GLOBULIN SER CALC-MCNC: 3.1 G/DL (ref 2.8–4.5)
GLUCOSE SERPL-MCNC: 94 MG/DL (ref 65–100)
HCT VFR BLD AUTO: 32.4 % (ref 35.8–46.3)
HGB BLD-MCNC: 10.9 G/DL (ref 11.7–15.4)
IMM GRANULOCYTES # BLD AUTO: 0 K/UL (ref 0–0.5)
IMM GRANULOCYTES NFR BLD AUTO: 0 % (ref 0–5)
LYMPHOCYTES # BLD: 0.3 K/UL (ref 0.5–4.6)
LYMPHOCYTES NFR BLD: 5 % (ref 13–44)
MCH RBC QN AUTO: 31.1 PG (ref 26.1–32.9)
MCHC RBC AUTO-ENTMCNC: 33.6 G/DL (ref 31.4–35)
MCV RBC AUTO: 92.3 FL (ref 82–102)
MONOCYTES # BLD: 0.4 K/UL (ref 0.1–1.3)
MONOCYTES NFR BLD: 7 % (ref 4–12)
NEUTS SEG # BLD: 4.9 K/UL (ref 1.7–8.2)
NEUTS SEG NFR BLD: 87 % (ref 43–78)
NRBC # BLD: 0 K/UL (ref 0–0.2)
PLATELET # BLD AUTO: 195 K/UL (ref 150–450)
PMV BLD AUTO: 10.2 FL (ref 9.4–12.3)
POTASSIUM SERPL-SCNC: 3.5 MMOL/L (ref 3.5–5.1)
PROT SERPL-MCNC: 6.6 G/DL (ref 6.4–8.2)
RBC # BLD AUTO: 3.51 M/UL (ref 4.05–5.2)
SARS-COV-2 RDRP RESP QL NAA+PROBE: DETECTED
SODIUM SERPL-SCNC: 136 MMOL/L (ref 133–143)
SOURCE: ABNORMAL
SPECIMEN SOURCE: NORMAL
WBC # BLD AUTO: 5.6 K/UL (ref 4.3–11.1)

## 2022-12-20 PROCEDURE — 87635 SARS-COV-2 COVID-19 AMP PRB: CPT

## 2022-12-20 PROCEDURE — 2580000003 HC RX 258: Performed by: EMERGENCY MEDICINE

## 2022-12-20 PROCEDURE — 80053 COMPREHEN METABOLIC PANEL: CPT

## 2022-12-20 PROCEDURE — 85025 COMPLETE CBC W/AUTO DIFF WBC: CPT

## 2022-12-20 PROCEDURE — 87804 INFLUENZA ASSAY W/OPTIC: CPT

## 2022-12-20 PROCEDURE — 99285 EMERGENCY DEPT VISIT HI MDM: CPT

## 2022-12-20 PROCEDURE — 96360 HYDRATION IV INFUSION INIT: CPT

## 2022-12-20 RX ORDER — 0.9 % SODIUM CHLORIDE 0.9 %
1000 INTRAVENOUS SOLUTION INTRAVENOUS ONCE
Status: COMPLETED | OUTPATIENT
Start: 2022-12-20 | End: 2022-12-20

## 2022-12-20 RX ADMIN — SODIUM CHLORIDE 1000 ML: 9 INJECTION, SOLUTION INTRAVENOUS at 11:37

## 2022-12-20 ASSESSMENT — ENCOUNTER SYMPTOMS
RHINORRHEA: 0
VOMITING: 1
ABDOMINAL PAIN: 0
DIARRHEA: 0
WHEEZING: 0
NAUSEA: 1
SORE THROAT: 0
SHORTNESS OF BREATH: 0
COUGH: 0

## 2022-12-20 ASSESSMENT — PAIN SCALES - GENERAL: PAINLEVEL_OUTOF10: 3

## 2022-12-20 ASSESSMENT — PAIN - FUNCTIONAL ASSESSMENT: PAIN_FUNCTIONAL_ASSESSMENT: 0-10

## 2022-12-20 ASSESSMENT — PAIN DESCRIPTION - DESCRIPTORS: DESCRIPTORS: ACHING

## 2022-12-20 NOTE — ED TRIAGE NOTES
Pt states she has had body aches, headache, fatigue since yesterday, also feels like she is having \"contractions every 45 mins since last night, alsting about 1 min. \" Denies vaginal bleeding, nausea vomiting last night. This is her second pregnancy, had twins at 27 weeks with last pregnancy.

## 2022-12-20 NOTE — ED NOTES
TRANSFER - OUT REPORT:    Verbal report given to Darcy MCGUIRE on Carl Berg  being transferred to [facility]  for routine progression of patient care       Report consisted of patient's Situation, Background, Assessment and   Recommendations(SBAR). Information from the following report(s) ED SBAR was reviewed with the receiving nurse. Lines:   Peripheral IV 12/20/22 Right Antecubital (Active)   Site Assessment Clean, dry & intact 12/20/22 1114   Line Status Blood return noted; Flushed;Normal saline locked 12/20/22 1114   Dressing Status New dressing applied 12/20/22 1114        Opportunity for questions and clarification was provided.       Patient transported with:  [facility]       Glenroy Gunn RN  12/20/22 5830

## 2022-12-20 NOTE — ED NOTES
Spoke to [facility] transfer [facility] regarding transferring to [address] [place] [address]. OB was paged and will call hospital main line to speak with Dr. Gabriella Ocampo.       Maryse Clark RN  12/20/22 1966

## 2022-12-20 NOTE — ED PROVIDER NOTES
Emergency Department Provider Note                   PCP:                Soumya Us MD               Age: 27 y.o. Sex: female       ICD-10-CM    1. COVID  U07.1       2. Pelvic pressure in pregnancy  O26.899     R10.2           DISPOSITION Decision To Transfer 12/20/2022 11:53:54 AM        MDM  Number of Diagnoses or Management Options  COVID  Pelvic pressure in pregnancy  Diagnosis management comments: Patient is a 75-year-old female G2, P2 with twin pregnancies who is 27 weeks OB. Patient's positive for flu and likely is causing her whole body aches and muscle aches and increase nausea vomiting over baseline. Patient states she has had intermittent cramping that lasts 1 minute at a time every 45 minutes or so and she is had that for the last several weeks but for the last week she has had increased pressure in the pubic area. Patient's physical exam is unremarkable and cervical os is closed no effacement. We have spoken to Dr. Naomy Gallegos team at [facility] as the patient is followed at [facility] and they have accepted the patient in transfer. Amount and/or Complexity of Data Reviewed  Clinical lab tests: ordered and reviewed  Tests in the radiology section of CPT®: ordered and reviewed  Review and summarize past medical records: yes  Independent visualization of images, tracings, or specimens: yes    Risk of Complications, Morbidity, and/or Mortality  Presenting problems: moderate  Diagnostic procedures: moderate  Management options: moderate       Complexity of Problem: 1 acute, uncomplicated illness or injury. (3)    I have conducted an independent ordering and review of Labs. Considerations: Shared decision making was utilized in the care of this patient. We discussed care recommended by provider that patient refuses (tests, admit, disposition, etc). Patient was discharged risks and benefits of hospitalization were discussed.    ED Course as of 12/20/22 1228   Tue Dec 20, 2022   1225 I spoke with the transfer center and they indicated Dr. Yousuf Cali has accepted the patient from [facility] and that Dr. Kaia Stevens was aware of the patient prior to their call. They also indicated that Dr. Yousuf Cali did not wish or require us to speak directly with them for acceptance. [SH]      ED Course User Index  [SH] Henry Simon MD        Orders Placed This Encounter   Procedures    COVID-19, Rapid    Rapid influenza A/B antigens    US PELVIS LIMITED    CBC with Auto Differential    CMP    Assess fetal heart tones        Medications   0.9 % sodium chloride bolus (1,000 mLs IntraVENous New Bag 12/20/22 1137)       New Prescriptions    No medications on file        Bonnie Barrera is a 27 y.o. female who presents to the Emergency Department with chief complaint of    Chief Complaint   Patient presents with    Contractions    Generalized Body Aches             Patient is a G2, P2 at 27-week OB the first pregnancy with twins who presents specifically with new complaint of whole body ache as well as increase in her nausea vomiting that she has had during her entire pregnancy. Patient states she has had abdominal contractions that lasts approximately 1 minute and occurs every 45 minutes or so for the last 7 weeks of her pregnancy and she has been followed by her OB for this and this is not a new presentation or complaint. Patient denies any vaginal bleeding or discharge and denies any pelvic pressure. Patient denies any vaginal leakage or rupture of membranes. Patient denies any fevers or chills. The history is provided by the patient.    Generalized Body Aches  Severity:  Mild  Onset quality:  Gradual  Duration:  2 days  Timing:  Intermittent  Progression:  Waxing and waning  Chronicity:  New  Associated symptoms: nausea and vomiting    Associated symptoms: no abdominal pain, no chest pain, no congestion, no cough, no diarrhea, no fever, no headaches, no loss of consciousness, no myalgias, no rash, no rhinorrhea, no shortness of breath, no sore throat and no wheezing        Review of Systems   Constitutional:  Negative for fever. HENT:  Negative for congestion, rhinorrhea and sore throat. Respiratory:  Negative for cough, shortness of breath and wheezing. Cardiovascular:  Negative for chest pain. Gastrointestinal:  Positive for nausea and vomiting. Negative for abdominal pain and diarrhea. Musculoskeletal:  Negative for myalgias. Skin:  Negative for rash. Neurological:  Negative for loss of consciousness and headaches. All other systems reviewed and are negative. Past Medical History:   Diagnosis Date    Anxiety     Crohn disease ([address]     Depression     Headache         Past Surgical History:   Procedure Laterality Date     SECTION      CHOLECYSTECTOMY      TONSILLECTOMY          History reviewed. No pertinent family history. Social History     Socioeconomic History    Marital status: Single     Spouse name: None    Number of children: None    Years of education: None    Highest education level: None   Tobacco Use    Smoking status: Never    Smokeless tobacco: Never   Vaping Use    Vaping Use: Never used   Substance and Sexual Activity    Alcohol use: Not Currently    Drug use: Never         Ciprofloxacin, Aloe vera, Clindamycin, and Prednisone     Previous Medications    ACETAMINOPHEN (TYLENOL) 325 MG TABLET    Take 1 or 2 tablets every 4 hours as needed for mild pain    NAPROXEN (NAPROSYN) 500 MG TABLET    Take 1 tablet by mouth 2 times daily as needed for Pain    PRENATAL MULTIVIT-MIN-FE-FA (PRE-LUCIUS FORMULA) TABS    Take by mouth    SUMATRIPTAN (IMITREX) 100 MG TABLET    once as needed         Vitals signs and nursing note reviewed. Patient Vitals for the past 4 hrs:   Temp Pulse Resp BP SpO2   22 1059 97.6 °F (36.4 °C) (!) 111 18 116/66 100 %          Physical Exam  Vitals and nursing note reviewed. Constitutional:       Appearance: Normal appearance.    HENT: Head: Normocephalic and atraumatic. Nose: Nose normal.      Mouth/Throat:      Mouth: Mucous membranes are moist.      Pharynx: Oropharynx is clear. Eyes:      Extraocular Movements: Extraocular movements intact. Conjunctiva/sclera: Conjunctivae normal.      Pupils: Pupils are equal, round, and reactive to light. Cardiovascular:      Rate and Rhythm: Normal rate. Pulses: Normal pulses. Pulmonary:      Effort: Pulmonary effort is normal.   Abdominal:      General: Abdomen is flat. Bowel sounds are normal.      Palpations: Abdomen is soft. Genitourinary:     Comments: Pelvic exam with nurse present as a chaperone: Cervical os closed, no effacement, no vaginal vault blood  Musculoskeletal:         General: Normal range of motion. Cervical back: Normal range of motion and neck supple. Skin:     General: Skin is warm. Capillary Refill: Capillary refill takes less than 2 seconds. Neurological:      General: No focal deficit present. Mental Status: She is alert and oriented to person, place, and time. Mental status is at baseline. Psychiatric:         Mood and Affect: Mood normal.         Behavior: Behavior normal.         Thought Content:  Thought content normal.         Judgment: Judgment normal.        Procedures    Results for orders placed or performed during the hospital encounter of 12/20/22   COVID-19, Rapid    Specimen: Nasopharyngeal   Result Value Ref Range    Source Nasopharyngeal      SARS-CoV-2, Rapid Detected (A) NOTD     Rapid influenza A/B antigens    Specimen: Nasal Washing   Result Value Ref Range    Influenza A Ag Negative NEG      Influenza B Ag Negative NEG      Source Nasopharyngeal     CBC with Auto Differential   Result Value Ref Range    WBC 5.6 4.3 - 11.1 K/uL    RBC 3.51 (L) 4.05 - 5.20 M/uL    Hemoglobin 10.9 (L) 11.7 - 15.4 g/dL    Hematocrit 32.4 (L) 35.8 - 46.3 %    MCV 92.3 82.0 - 102.0 FL    MCH 31.1 26.1 - 32.9 PG    MCHC 33.6 31.4 - 35.0 g/dL RDW 12.0 11.9 - 14.6 %    Platelets 631 550 - 859 K/uL    MPV 10.2 9.4 - 12.3 FL    nRBC 0.00 0.0 - 0.2 K/uL    Differential Type AUTOMATED      Seg Neutrophils 87 (H) 43 - 78 %    Lymphocytes 5 (L) 13 - 44 %    Monocytes 7 4.0 - 12.0 %    Eosinophils % 1 0.5 - 7.8 %    Basophils 0 0.0 - 2.0 %    Immature Granulocytes 0 0.0 - 5.0 %    Segs Absolute 4.9 1.7 - 8.2 K/UL    Absolute Lymph # 0.3 (L) 0.5 - 4.6 K/UL    Absolute Mono # 0.4 0.1 - 1.3 K/UL    Absolute Eos # 0.0 0.0 - 0.8 K/UL    Basophils Absolute 0.0 0.0 - 0.2 K/UL    Absolute Immature Granulocyte 0.0 0.0 - 0.5 K/UL        US PELVIS LIMITED    (Results Pending)                       Voice dictation software was used during the making of this note. This software is not perfect and grammatical and other typographical errors may be present. This note has not been completely proofread for errors.       Vidhya Kwong MD  12/20/22 [id number] [facility], MD  12/20/22 4121

## 2022-12-20 NOTE — ED NOTES
Patient clarified that her contraction like pain has been going on for about 7 weeks, and her body aches started last night. Contractions have not changed in last 7 weeks she states.       Marcial Wen RN  12/20/22 5816

## 2022-12-20 NOTE — ED NOTES
Called Corinne to give report and was told by on call physician that patient needed to go to [facility] for higher level of care.       Jasen Ayers RN  12/20/22 1561

## 2023-06-29 NOTE — TELEPHONE ENCOUNTER
Called and advised pt per Dr. Maggie Burrell Naproxin 500mg BID for 5 days. Pt voiced understanding. negative soft

## 2024-05-02 NOTE — PROGRESS NOTES
[facility] Note    Name: Jose Storey  YOB: 1990  Gender: female  MRN: [id number]      Follow up visit:   1. Closed nondisplaced fracture of distal phalanx of right middle finger, initial encounter    2. Nondisplaced fracture of distal phalanx of right ring finger, initial encounter for closed fracture        HPI: Jose Storey is a 27 y.o. female who is following up for severe pain in her right ring finger. She has returned to work, and has soaked it in peroxide which helps somewhat. He has not noticed any additional discharge from the finger. ROS/Meds/PSH/PMH/FH/SH: I personally reviewed the patients standard intake form. Pertinents are discussed in the HPI    Physical Examination:    Musculoskeletal Examination:  Examination on the right upper extremity demonstrates cap refill < 5 seconds in all fingers, skin is intact, nail plate is intact at the middle finger, with no subungual hematoma. She has mild tenderness to palpation of the middle finger distal phalanx. She has severe tenderness at the ring finger distal phalanx. Nail plate has been removed, and nailbed is in good condition. There is no active drainage. There is minimal surrounding erythema, which is to be expected following recent nail plate removal..    Imaging / Electrodiagnostic Tests:     none    Assessment:     ICD-10-CM    1. Closed nondisplaced fracture of distal phalanx of right middle finger, initial encounter  S62.662A    2. Nondisplaced fracture of distal phalanx of right ring finger, initial encounter for closed fracture  U69.417B        Plan:   We discussed the diagnosis and different treatment options. We discussed observation, therapy, antiinflammatory medications and other pertinent treatment modalities. After discussing in detail the patient elects to proceed with sterile anti-inflammatories or Tylenol as needed for pain.   She can continue to take the naproxen that I prescribed her as well as the Keflex. She can continue with soap and water washes 2-3 times daily. I provided an AlumaFoam finger splint for comfort. Recommended against continuing to work, as this seems to be aggravating her symptoms. I will see her back next week for wound check. Patient voiced accordance and understanding of the information provided and the formulated plan. All questions were answered to the patient's satisfaction during the encounter.       Uriel Herron MD  Orthopaedic Surgery  06/16/22  4:50 PM yes

## 2024-09-12 ENCOUNTER — OFFICE VISIT (OUTPATIENT)
Dept: ENDOCRINOLOGY | Age: 34
End: 2024-09-12
Payer: MEDICAID

## 2024-09-12 ENCOUNTER — TELEPHONE (OUTPATIENT)
Dept: ENDOCRINOLOGY | Age: 34
End: 2024-09-12

## 2024-09-12 VITALS
WEIGHT: 119.4 LBS | RESPIRATION RATE: 99 BRPM | DIASTOLIC BLOOD PRESSURE: 78 MMHG | SYSTOLIC BLOOD PRESSURE: 130 MMHG | BODY MASS INDEX: 21.15 KG/M2 | HEART RATE: 51 BPM

## 2024-09-12 DIAGNOSIS — E03.9 ACQUIRED HYPOTHYROIDISM: Primary | ICD-10-CM

## 2024-09-12 DIAGNOSIS — E03.9 ACQUIRED HYPOTHYROIDISM: ICD-10-CM

## 2024-09-12 LAB
T4 FREE SERPL-MCNC: 0.6 NG/DL (ref 0.9–1.7)
TSH, 3RD GENERATION: 19.3 UIU/ML (ref 0.27–4.2)

## 2024-09-12 PROCEDURE — 99205 OFFICE O/P NEW HI 60 MIN: CPT | Performed by: STUDENT IN AN ORGANIZED HEALTH CARE EDUCATION/TRAINING PROGRAM

## 2024-09-12 RX ORDER — CITALOPRAM HYDROBROMIDE 40 MG/1
40 TABLET ORAL DAILY
COMMUNITY

## 2024-09-12 RX ORDER — PROPRANOLOL HYDROCHLORIDE 20 MG/1
20 TABLET ORAL 2 TIMES DAILY
COMMUNITY
Start: 2024-09-03

## 2024-09-12 RX ORDER — ATOGEPANT 60 MG/1
60 TABLET ORAL
COMMUNITY

## 2024-09-12 RX ORDER — OMEPRAZOLE 40 MG/1
40 CAPSULE, DELAYED RELEASE ORAL DAILY
COMMUNITY

## 2024-09-12 RX ORDER — CLONAZEPAM 0.5 MG/1
0.5 TABLET ORAL 3 TIMES DAILY PRN
COMMUNITY

## 2024-09-12 RX ORDER — MIDODRINE HYDROCHLORIDE 10 MG/1
10 TABLET ORAL 3 TIMES DAILY
COMMUNITY
Start: 2024-08-25

## 2024-09-12 RX ORDER — DEXTROAMPHETAMINE SACCHARATE, AMPHETAMINE ASPARTATE MONOHYDRATE, DEXTROAMPHETAMINE SULFATE AND AMPHETAMINE SULFATE 7.5; 7.5; 7.5; 7.5 MG/1; MG/1; MG/1; MG/1
30 CAPSULE, EXTENDED RELEASE ORAL EVERY MORNING
COMMUNITY

## 2024-09-12 RX ORDER — TRAZODONE HYDROCHLORIDE 50 MG/1
50 TABLET, FILM COATED ORAL NIGHTLY
COMMUNITY
Start: 2024-09-03

## 2024-09-12 RX ORDER — LEVOTHYROXINE SODIUM 75 UG/1
75 TABLET ORAL DAILY
COMMUNITY
Start: 2024-09-05 | End: 2024-09-13 | Stop reason: ALTCHOICE

## 2024-09-12 RX ORDER — OXYCODONE AND ACETAMINOPHEN 5; 325 MG/1; MG/1
1 TABLET ORAL 2 TIMES DAILY
COMMUNITY
Start: 2024-09-08

## 2024-09-12 RX ORDER — FLUDROCORTISONE ACETATE 0.1 MG/1
0.1 TABLET ORAL DAILY
COMMUNITY

## 2024-09-12 ASSESSMENT — ENCOUNTER SYMPTOMS
TROUBLE SWALLOWING: 1
SHORTNESS OF BREATH: 0
SORE THROAT: 1
VOICE CHANGE: 0
CONSTIPATION: 0
EYE REDNESS: 0
ABDOMINAL PAIN: 1
EYE ITCHING: 0
COUGH: 0
DIARRHEA: 0
EYE PAIN: 0
NAUSEA: 0

## 2024-09-13 RX ORDER — LEVOTHYROXINE SODIUM 88 UG/1
88 TABLET ORAL DAILY
Qty: 30 TABLET | Refills: 2 | Status: SHIPPED | OUTPATIENT
Start: 2024-09-13

## 2024-09-14 LAB — T3FREE SERPL-MCNC: 2.4 PG/ML (ref 2–4.4)

## 2024-09-15 LAB — THYROPEROXIDASE AB SERPL-ACNC: 447 IU/ML (ref 0–34)

## 2024-10-14 DIAGNOSIS — E03.9 ACQUIRED HYPOTHYROIDISM: ICD-10-CM

## 2024-10-14 LAB
T4 FREE SERPL-MCNC: 0.4 NG/DL (ref 0.9–1.7)
TSH, 3RD GENERATION: 44.9 UIU/ML (ref 0.27–4.2)

## 2024-10-17 RX ORDER — LEVOTHYROXINE SODIUM 100 UG/1
100 TABLET ORAL DAILY
Qty: 90 TABLET | Refills: 1 | Status: SHIPPED | OUTPATIENT
Start: 2024-10-17

## 2024-10-17 NOTE — PROGRESS NOTES
Call the patient directly regarding her abnormal thyroid labs to discuss medical compliance.  The minute the patient reports that she has been compliant with taking 88 mcg of levothyroxine daily.  However labs to suggest that she is either noncompliant or underdosed so we will go ahead and escalate her dose to 100 mcg levothyroxine daily.  The patient is taking correctly on an empty stomach and separate from other supplementation.  Plan for recheck of her thyroid labs prior to follow-up.  The patient also inform me that she has stopped all of her other medications including her psych meds and her cardiac meds  So I took the initiative to call her PCP Dr. Rafi Velasquez in [facility] in order to make sure that he is aware.  I educated the patient on how dangerous it can be to abruptly stop these medications.    Thaddeus Arthur May, DO

## 2024-11-11 DIAGNOSIS — E03.9 ACQUIRED HYPOTHYROIDISM: ICD-10-CM

## 2024-11-11 LAB
T4 FREE SERPL-MCNC: 0.5 NG/DL (ref 0.9–1.7)
TSH, 3RD GENERATION: 32.9 UIU/ML (ref 0.27–4.2)

## 2024-11-22 ENCOUNTER — OFFICE VISIT (OUTPATIENT)
Dept: ENDOCRINOLOGY | Age: 34
End: 2024-11-22
Payer: MEDICAID

## 2024-11-22 VITALS
SYSTOLIC BLOOD PRESSURE: 122 MMHG | WEIGHT: 121.8 LBS | DIASTOLIC BLOOD PRESSURE: 72 MMHG | HEART RATE: 76 BPM | BODY MASS INDEX: 21.58 KG/M2 | OXYGEN SATURATION: 97 %

## 2024-11-22 DIAGNOSIS — E03.9 ACQUIRED HYPOTHYROIDISM: Primary | ICD-10-CM

## 2024-11-22 PROCEDURE — 99214 OFFICE O/P EST MOD 30 MIN: CPT | Performed by: STUDENT IN AN ORGANIZED HEALTH CARE EDUCATION/TRAINING PROGRAM

## 2024-11-22 RX ORDER — LEVOTHYROXINE SODIUM 100 UG/1
100 TABLET ORAL DAILY
Qty: 60 TABLET | Refills: 1 | Status: SHIPPED | OUTPATIENT
Start: 2024-11-22

## 2024-11-22 RX ORDER — LAMOTRIGINE 25 MG/1
25 TABLET ORAL DAILY
COMMUNITY
Start: 2024-10-25

## 2024-11-22 ASSESSMENT — ENCOUNTER SYMPTOMS
SHORTNESS OF BREATH: 0
NAUSEA: 0
EYE ITCHING: 0
SORE THROAT: 1
COUGH: 0
ABDOMINAL PAIN: 1
DIARRHEA: 1
EYE REDNESS: 0
CONSTIPATION: 1
VOICE CHANGE: 1
TROUBLE SWALLOWING: 1
EYE PAIN: 0

## 2024-11-22 NOTE — PROGRESS NOTES
Conjunctiva/sclera: Conjunctivae normal.      Pupils: Pupils are equal, round, and reactive to light.   Neck:      Thyroid: Thyromegaly present. No thyroid mass or thyroid tenderness.      Comments: Mild, diffuse thyroid enlargement, no noted nodularity  Cardiovascular:      Rate and Rhythm: Normal rate and regular rhythm.      Pulses: Normal pulses.      Heart sounds: Normal heart sounds. No murmur heard.  Pulmonary:      Effort: Pulmonary effort is normal.      Breath sounds: Normal breath sounds.   Abdominal:      General: Abdomen is flat.      Palpations: Abdomen is soft.   Musculoskeletal:         General: No swelling or deformity.      Cervical back: Normal range of motion and neck supple. No tenderness.   Lymphadenopathy:      Cervical: No cervical adenopathy.   Skin:     General: Skin is warm and dry.   Neurological:      General: No focal deficit present.      Mental Status: She is alert and oriented to person, place, and time. Mental status is at baseline.   Psychiatric:         Mood and Affect: Mood normal.         Behavior: Behavior normal.         Thought Content: Thought content normal.         Judgment: Judgment normal.         Return in about 3 months (around 2/22/2025) for Hypothyroid.   Labs due in 3 weeks.    Thaddeus Ocampo DO     On this date 11/22/2024  I have spent 30 minutes reviewing previous notes, test results and face to face with the patient discussing diagnosis, workup and follow up plan as well as documenting on the day of the visit. More than 50% of the total time spent face to face with patient on education, counseling, & coordination of care for the patient.        Portions of this note were generated with the assistance of voice recognition software.  As such, some errors in transcription may be present.

## 2024-11-22 NOTE — ASSESSMENT & PLAN NOTE
Next labs will be due in 3 weeks.  Suspect that her Crohn's disease is contributing to poor absorption.

## 2024-12-05 ENCOUNTER — HOSPITAL ENCOUNTER (INPATIENT)
Age: 34
LOS: 4 days | Discharge: HOME | DRG: 424 | End: 2024-12-09
Attending: EMERGENCY MEDICINE | Admitting: STUDENT IN AN ORGANIZED HEALTH CARE EDUCATION/TRAINING PROGRAM
Payer: MEDICAID

## 2024-12-05 ENCOUNTER — APPOINTMENT (OUTPATIENT)
Dept: GENERAL RADIOLOGY | Age: 34
DRG: 424 | End: 2024-12-05
Payer: MEDICAID

## 2024-12-05 DIAGNOSIS — R00.1 SYMPTOMATIC BRADYCARDIA: Primary | ICD-10-CM

## 2024-12-05 PROBLEM — G90.A POTS (POSTURAL ORTHOSTATIC TACHYCARDIA SYNDROME): Status: ACTIVE | Noted: 2024-12-05

## 2024-12-05 PROBLEM — Q21.10 ASD (ATRIAL SEPTAL DEFECT): Status: ACTIVE | Noted: 2024-12-05

## 2024-12-05 PROBLEM — E03.9 SEVERE HYPOTHYROIDISM: Status: ACTIVE | Noted: 2024-12-05

## 2024-12-05 PROBLEM — K50.90 CROHN DISEASE (HCC): Status: ACTIVE | Noted: 2024-12-05

## 2024-12-05 PROBLEM — K21.9 GERD (GASTROESOPHAGEAL REFLUX DISEASE): Status: ACTIVE | Noted: 2024-12-05

## 2024-12-05 LAB
ALBUMIN SERPL-MCNC: 4.1 G/DL (ref 3.5–5)
ALBUMIN/GLOB SERPL: 1 (ref 1–1.9)
ALP SERPL-CCNC: 51 U/L (ref 35–104)
ALT SERPL-CCNC: 35 U/L (ref 8–45)
ANION GAP SERPL CALC-SCNC: 9 MMOL/L (ref 7–16)
AST SERPL-CCNC: 30 U/L (ref 15–37)
BASOPHILS # BLD: 0.1 K/UL (ref 0–0.2)
BASOPHILS NFR BLD: 1 % (ref 0–2)
BILIRUB SERPL-MCNC: 0.7 MG/DL (ref 0–1.2)
BUN SERPL-MCNC: 20 MG/DL (ref 6–23)
CALCIUM SERPL-MCNC: 9.2 MG/DL (ref 8.8–10.2)
CHLORIDE SERPL-SCNC: 104 MMOL/L (ref 98–107)
CO2 SERPL-SCNC: 25 MMOL/L (ref 20–29)
CORTIS BS SERPL-MCNC: 9.6 UG/DL
CREAT SERPL-MCNC: 1.12 MG/DL (ref 0.6–1.1)
DIFFERENTIAL METHOD BLD: NORMAL
EOSINOPHIL # BLD: 0.2 K/UL (ref 0–0.8)
EOSINOPHIL NFR BLD: 3 % (ref 0.5–7.8)
ERYTHROCYTE [DISTWIDTH] IN BLOOD BY AUTOMATED COUNT: 12.8 % (ref 11.9–14.6)
GLOBULIN SER CALC-MCNC: 4 G/DL (ref 2.3–3.5)
GLUCOSE SERPL-MCNC: 97 MG/DL (ref 70–99)
HCT VFR BLD AUTO: 41.9 % (ref 35.8–46.3)
HGB BLD-MCNC: 13.3 G/DL (ref 11.7–15.4)
IMM GRANULOCYTES # BLD AUTO: 0 K/UL (ref 0–0.5)
IMM GRANULOCYTES NFR BLD AUTO: 0 % (ref 0–5)
LACTATE SERPL-SCNC: 0.5 MMOL/L (ref 0.5–2)
LYMPHOCYTES # BLD: 3.3 K/UL (ref 0.5–4.6)
LYMPHOCYTES NFR BLD: 38 % (ref 13–44)
MAGNESIUM SERPL-MCNC: 2.5 MG/DL (ref 1.8–2.4)
MCH RBC QN AUTO: 30.6 PG (ref 26.1–32.9)
MCHC RBC AUTO-ENTMCNC: 31.7 G/DL (ref 31.4–35)
MCV RBC AUTO: 96.5 FL (ref 82–102)
MONOCYTES # BLD: 0.7 K/UL (ref 0.1–1.3)
MONOCYTES NFR BLD: 8 % (ref 4–12)
NEUTS SEG # BLD: 4.4 K/UL (ref 1.7–8.2)
NEUTS SEG NFR BLD: 50 % (ref 43–78)
NRBC # BLD: 0 K/UL (ref 0–0.2)
PLATELET # BLD AUTO: 441 K/UL (ref 150–450)
PMV BLD AUTO: 10.5 FL (ref 9.4–12.3)
POTASSIUM SERPL-SCNC: 3.8 MMOL/L (ref 3.5–5.1)
PROCALCITONIN SERPL-MCNC: 0.1 NG/ML (ref 0–0.1)
PROT SERPL-MCNC: 8 G/DL (ref 6.3–8.2)
RBC # BLD AUTO: 4.34 M/UL (ref 4.05–5.2)
SODIUM SERPL-SCNC: 139 MMOL/L (ref 136–145)
T4 FREE SERPL-MCNC: 0.6 NG/DL (ref 0.9–1.7)
TROPONIN T SERPL HS-MCNC: <6 NG/L (ref 0–14)
TROPONIN T SERPL HS-MCNC: <6 NG/L (ref 0–14)
TSH W FREE THYROID IF ABNORMAL: 56.6 UIU/ML (ref 0.27–4.2)
WBC # BLD AUTO: 8.6 K/UL (ref 4.3–11.1)

## 2024-12-05 PROCEDURE — 82533 TOTAL CORTISOL: CPT

## 2024-12-05 PROCEDURE — 6360000002 HC RX W HCPCS: Performed by: NURSE PRACTITIONER

## 2024-12-05 PROCEDURE — 85025 COMPLETE CBC W/AUTO DIFF WBC: CPT

## 2024-12-05 PROCEDURE — 84145 PROCALCITONIN (PCT): CPT

## 2024-12-05 PROCEDURE — 80053 COMPREHEN METABOLIC PANEL: CPT

## 2024-12-05 PROCEDURE — 96374 THER/PROPH/DIAG INJ IV PUSH: CPT

## 2024-12-05 PROCEDURE — 6360000002 HC RX W HCPCS: Performed by: STUDENT IN AN ORGANIZED HEALTH CARE EDUCATION/TRAINING PROGRAM

## 2024-12-05 PROCEDURE — 71045 X-RAY EXAM CHEST 1 VIEW: CPT

## 2024-12-05 PROCEDURE — 96375 TX/PRO/DX INJ NEW DRUG ADDON: CPT

## 2024-12-05 PROCEDURE — 93005 ELECTROCARDIOGRAM TRACING: CPT | Performed by: EMERGENCY MEDICINE

## 2024-12-05 PROCEDURE — 6370000000 HC RX 637 (ALT 250 FOR IP): Performed by: STUDENT IN AN ORGANIZED HEALTH CARE EDUCATION/TRAINING PROGRAM

## 2024-12-05 PROCEDURE — 84443 ASSAY THYROID STIM HORMONE: CPT

## 2024-12-05 PROCEDURE — 83605 ASSAY OF LACTIC ACID: CPT

## 2024-12-05 PROCEDURE — 2580000003 HC RX 258: Performed by: EMERGENCY MEDICINE

## 2024-12-05 PROCEDURE — 1100000003 HC PRIVATE W/ TELEMETRY

## 2024-12-05 PROCEDURE — 83735 ASSAY OF MAGNESIUM: CPT

## 2024-12-05 PROCEDURE — 2580000003 HC RX 258: Performed by: STUDENT IN AN ORGANIZED HEALTH CARE EDUCATION/TRAINING PROGRAM

## 2024-12-05 PROCEDURE — 99285 EMERGENCY DEPT VISIT HI MDM: CPT

## 2024-12-05 PROCEDURE — 84439 ASSAY OF FREE THYROXINE: CPT

## 2024-12-05 PROCEDURE — 36415 COLL VENOUS BLD VENIPUNCTURE: CPT

## 2024-12-05 PROCEDURE — 6360000002 HC RX W HCPCS: Performed by: EMERGENCY MEDICINE

## 2024-12-05 PROCEDURE — 84484 ASSAY OF TROPONIN QUANT: CPT

## 2024-12-05 RX ORDER — CITALOPRAM HYDROBROMIDE 20 MG/1
40 TABLET ORAL DAILY
Status: DISCONTINUED | OUTPATIENT
Start: 2024-12-06 | End: 2024-12-09 | Stop reason: HOSPADM

## 2024-12-05 RX ORDER — DEXTROAMPHETAMINE SACCHARATE, AMPHETAMINE ASPARTATE MONOHYDRATE, DEXTROAMPHETAMINE SULFATE AND AMPHETAMINE SULFATE 7.5; 7.5; 7.5; 7.5 MG/1; MG/1; MG/1; MG/1
30 CAPSULE, EXTENDED RELEASE ORAL EVERY MORNING
Status: DISCONTINUED | OUTPATIENT
Start: 2024-12-06 | End: 2024-12-09 | Stop reason: HOSPADM

## 2024-12-05 RX ORDER — POLYETHYLENE GLYCOL 3350 17 G/17G
17 POWDER, FOR SOLUTION ORAL DAILY PRN
Status: DISCONTINUED | OUTPATIENT
Start: 2024-12-05 | End: 2024-12-09 | Stop reason: HOSPADM

## 2024-12-05 RX ORDER — SODIUM CHLORIDE 0.9 % (FLUSH) 0.9 %
5-40 SYRINGE (ML) INJECTION EVERY 12 HOURS SCHEDULED
Status: DISCONTINUED | OUTPATIENT
Start: 2024-12-05 | End: 2024-12-09 | Stop reason: HOSPADM

## 2024-12-05 RX ORDER — KETOROLAC TROMETHAMINE 15 MG/ML
15 INJECTION, SOLUTION INTRAMUSCULAR; INTRAVENOUS ONCE
Status: COMPLETED | OUTPATIENT
Start: 2024-12-05 | End: 2024-12-05

## 2024-12-05 RX ORDER — ONDANSETRON 2 MG/ML
4 INJECTION INTRAMUSCULAR; INTRAVENOUS EVERY 6 HOURS PRN
Status: DISCONTINUED | OUTPATIENT
Start: 2024-12-05 | End: 2024-12-09 | Stop reason: HOSPADM

## 2024-12-05 RX ORDER — ENOXAPARIN SODIUM 100 MG/ML
40 INJECTION SUBCUTANEOUS DAILY
Status: DISCONTINUED | OUTPATIENT
Start: 2024-12-06 | End: 2024-12-09 | Stop reason: HOSPADM

## 2024-12-05 RX ORDER — POTASSIUM CHLORIDE 7.45 MG/ML
10 INJECTION INTRAVENOUS PRN
Status: DISCONTINUED | OUTPATIENT
Start: 2024-12-05 | End: 2024-12-09 | Stop reason: HOSPADM

## 2024-12-05 RX ORDER — SODIUM CHLORIDE 0.9 % (FLUSH) 0.9 %
5-40 SYRINGE (ML) INJECTION PRN
Status: DISCONTINUED | OUTPATIENT
Start: 2024-12-05 | End: 2024-12-09 | Stop reason: HOSPADM

## 2024-12-05 RX ORDER — SODIUM CHLORIDE 9 MG/ML
INJECTION, SOLUTION INTRAVENOUS PRN
Status: DISCONTINUED | OUTPATIENT
Start: 2024-12-05 | End: 2024-12-09 | Stop reason: HOSPADM

## 2024-12-05 RX ORDER — HYDROCORTISONE SODIUM SUCCINATE 100 MG/2ML
100 INJECTION INTRAMUSCULAR; INTRAVENOUS EVERY 8 HOURS
Status: DISCONTINUED | OUTPATIENT
Start: 2024-12-05 | End: 2024-12-08

## 2024-12-05 RX ORDER — ONDANSETRON 4 MG/1
4 TABLET, ORALLY DISINTEGRATING ORAL EVERY 8 HOURS PRN
Status: DISCONTINUED | OUTPATIENT
Start: 2024-12-05 | End: 2024-12-09 | Stop reason: HOSPADM

## 2024-12-05 RX ORDER — 0.9 % SODIUM CHLORIDE 0.9 %
1000 INTRAVENOUS SOLUTION INTRAVENOUS ONCE
Status: COMPLETED | OUTPATIENT
Start: 2024-12-05 | End: 2024-12-05

## 2024-12-05 RX ORDER — ACETAMINOPHEN 650 MG/1
650 SUPPOSITORY RECTAL EVERY 6 HOURS PRN
Status: DISCONTINUED | OUTPATIENT
Start: 2024-12-05 | End: 2024-12-09 | Stop reason: HOSPADM

## 2024-12-05 RX ORDER — LEVOTHYROXINE SODIUM ANHYDROUS 100 UG/5ML
100 INJECTION, POWDER, LYOPHILIZED, FOR SOLUTION INTRAVENOUS DAILY
Status: DISCONTINUED | OUTPATIENT
Start: 2024-12-05 | End: 2024-12-05 | Stop reason: SDUPTHER

## 2024-12-05 RX ORDER — MIDODRINE HYDROCHLORIDE 5 MG/1
10 TABLET ORAL 3 TIMES DAILY
Status: DISCONTINUED | OUTPATIENT
Start: 2024-12-05 | End: 2024-12-06

## 2024-12-05 RX ORDER — LEVOTHYROXINE SODIUM 20 UG/ML
100 INJECTION, SOLUTION INTRAVENOUS DAILY
Status: DISCONTINUED | OUTPATIENT
Start: 2024-12-05 | End: 2024-12-09 | Stop reason: HOSPADM

## 2024-12-05 RX ORDER — ONDANSETRON 2 MG/ML
4 INJECTION INTRAMUSCULAR; INTRAVENOUS
Status: COMPLETED | OUTPATIENT
Start: 2024-12-05 | End: 2024-12-05

## 2024-12-05 RX ORDER — PANTOPRAZOLE SODIUM 40 MG/1
40 TABLET, DELAYED RELEASE ORAL
Status: DISCONTINUED | OUTPATIENT
Start: 2024-12-06 | End: 2024-12-09 | Stop reason: HOSPADM

## 2024-12-05 RX ORDER — MAGNESIUM SULFATE IN WATER 40 MG/ML
2000 INJECTION, SOLUTION INTRAVENOUS PRN
Status: DISCONTINUED | OUTPATIENT
Start: 2024-12-05 | End: 2024-12-09 | Stop reason: HOSPADM

## 2024-12-05 RX ORDER — KETOROLAC TROMETHAMINE 30 MG/ML
30 INJECTION, SOLUTION INTRAMUSCULAR; INTRAVENOUS
Status: COMPLETED | OUTPATIENT
Start: 2024-12-05 | End: 2024-12-05

## 2024-12-05 RX ORDER — POTASSIUM CHLORIDE 1500 MG/1
40 TABLET, EXTENDED RELEASE ORAL PRN
Status: DISCONTINUED | OUTPATIENT
Start: 2024-12-05 | End: 2024-12-09 | Stop reason: HOSPADM

## 2024-12-05 RX ORDER — ACETAMINOPHEN 325 MG/1
650 TABLET ORAL EVERY 6 HOURS PRN
Status: DISCONTINUED | OUTPATIENT
Start: 2024-12-05 | End: 2024-12-09 | Stop reason: HOSPADM

## 2024-12-05 RX ADMIN — SODIUM CHLORIDE, PRESERVATIVE FREE 10 ML: 5 INJECTION INTRAVENOUS at 21:37

## 2024-12-05 RX ADMIN — KETOROLAC TROMETHAMINE 15 MG: 15 INJECTION, SOLUTION INTRAMUSCULAR; INTRAVENOUS at 22:34

## 2024-12-05 RX ADMIN — HYDROCORTISONE SODIUM SUCCINATE 100 MG: 100 INJECTION, POWDER, FOR SOLUTION INTRAMUSCULAR; INTRAVENOUS at 21:36

## 2024-12-05 RX ADMIN — KETOROLAC TROMETHAMINE 30 MG: 30 INJECTION, SOLUTION INTRAMUSCULAR at 18:58

## 2024-12-05 RX ADMIN — ONDANSETRON 4 MG: 2 INJECTION INTRAMUSCULAR; INTRAVENOUS at 17:58

## 2024-12-05 RX ADMIN — MIDODRINE HYDROCHLORIDE 10 MG: 5 TABLET ORAL at 21:36

## 2024-12-05 RX ADMIN — SODIUM CHLORIDE 1000 ML: 9 INJECTION, SOLUTION INTRAVENOUS at 17:51

## 2024-12-05 ASSESSMENT — LIFESTYLE VARIABLES
HOW MANY STANDARD DRINKS CONTAINING ALCOHOL DO YOU HAVE ON A TYPICAL DAY: 1 OR 2
HOW OFTEN DO YOU HAVE A DRINK CONTAINING ALCOHOL: MONTHLY OR LESS

## 2024-12-05 ASSESSMENT — PAIN DESCRIPTION - LOCATION
LOCATION: HEAD;GENERALIZED
LOCATION: HEAD
LOCATION: CHEST;HEAD

## 2024-12-05 ASSESSMENT — PAIN SCALES - GENERAL
PAINLEVEL_OUTOF10: 6
PAINLEVEL_OUTOF10: 7
PAINLEVEL_OUTOF10: 0
PAINLEVEL_OUTOF10: 8

## 2024-12-05 ASSESSMENT — PAIN DESCRIPTION - DESCRIPTORS: DESCRIPTORS: ACHING

## 2024-12-05 NOTE — ED TRIAGE NOTES
Patient arrives to ED pov from home. Patient reports the cardiologist told her to come to ED because her heart rate has been low. Patient reports history of POTS. Patient reports she is on midodrine. Patient reports they have talked about doing a pacemaker. Patient reports feeling SOB and chest pain.

## 2024-12-05 NOTE — ED PROVIDER NOTES
Emergency Department Provider Note       PCP: Rafi Velasquez MD   Age: 34 y.o.   Sex: female     DISPOSITION Decision To Admit 12/05/2024 06:54:37 PM    ICD-10-CM    1. Symptomatic bradycardia  R00.1           Medical Decision Making     34-year-old female with history of POTS, bipolar disorder, GERD, Crohn's disease, hypothyroidism, ASD, bradycardia presents via EMS from home with complaint of labile heart rate, generalized weakness, fatigue, shortness of breath and intermittent chest pain.  Patient states that she contacted her cardiologist who instructed her to come to ER.  Patient states she been compliant with her midodrine.  Heart rate on arrival 33.  Normotensive.  Afebrile.  TSH 56.6.  Troponin less than 6.  Magnesium 2.5.  Lactic acid and procalcitonin within normal limits.  EKG with heart rate of 33.  Sinus bradycardia.  Early repolarization.  Patient given 1 L IV fluid bolus.    Pads in place.  Case discussed with Dr. Millan with [facility] cardiology.  Recommends hospitalist admit and cardiology to follow in consult.  Recommends that the patient becomes hypotensive to administer atropine.    ED Course as of 12/05/24 1855   Thu Dec 05, 2024   1846 TSH w Free Thyroid if Abnormal(!): 56.60 [DF]   1846 Magnesium(!): 2.5 [DF]   1853 Lactic Acid, Sepsis: 0.5 [DF]   1853 Procalcitonin: 0.10 [DF]   1853 Troponin T: <6.0 [DF]   1853 CXR FINDINGS: The lungs are clear. There are no infiltrates or effusions.  The heart  size is normal.  The bony thorax is intact.       IMPRESSION:  No acute findings in the chest      [DF]      ED Course User Index  [DF] Wilber Rosado Jr., MD     1 or more acute illnesses that pose a threat to life or bodily function.   1 or more chronic illnesses with a severe exacerbation or progression.  Discussion with external consultants.  Chronic medical problems impacting care include hypothyroidism, bradycardia.  Shared medical decision making was utilized in creating the patients

## 2024-12-06 DIAGNOSIS — E03.9 ACQUIRED HYPOTHYROIDISM: Primary | ICD-10-CM

## 2024-12-06 DIAGNOSIS — K50.919: ICD-10-CM

## 2024-12-06 LAB
ANION GAP SERPL CALC-SCNC: 10 MMOL/L (ref 7–16)
BASOPHILS # BLD: 0 K/UL (ref 0–0.2)
BASOPHILS NFR BLD: 0 % (ref 0–2)
BUN SERPL-MCNC: 16 MG/DL (ref 6–23)
CALCIUM SERPL-MCNC: 9.1 MG/DL (ref 8.8–10.2)
CHLORIDE SERPL-SCNC: 105 MMOL/L (ref 98–107)
CO2 SERPL-SCNC: 25 MMOL/L (ref 20–29)
CREAT SERPL-MCNC: 0.95 MG/DL (ref 0.6–1.1)
DIFFERENTIAL METHOD BLD: ABNORMAL
EKG ATRIAL RATE: 34 BPM
EKG DIAGNOSIS: NORMAL
EKG P AXIS: 65 DEGREES
EKG P-R INTERVAL: 133 MS
EKG Q-T INTERVAL: 572 MS
EKG QRS DURATION: 104 MS
EKG QTC CALCULATION (BAZETT): 431 MS
EKG R AXIS: 17 DEGREES
EKG T AXIS: 45 DEGREES
EKG VENTRICULAR RATE: 34 BPM
EOSINOPHIL # BLD: 0 K/UL (ref 0–0.8)
EOSINOPHIL NFR BLD: 0 % (ref 0.5–7.8)
ERYTHROCYTE [DISTWIDTH] IN BLOOD BY AUTOMATED COUNT: 12.8 % (ref 11.9–14.6)
GLUCOSE SERPL-MCNC: 128 MG/DL (ref 70–99)
HCT VFR BLD AUTO: 42.4 % (ref 35.8–46.3)
HGB BLD-MCNC: 13.7 G/DL (ref 11.7–15.4)
IMM GRANULOCYTES # BLD AUTO: 0 K/UL (ref 0–0.5)
IMM GRANULOCYTES NFR BLD AUTO: 0 % (ref 0–5)
LYMPHOCYTES # BLD: 1.7 K/UL (ref 0.5–4.6)
LYMPHOCYTES NFR BLD: 27 % (ref 13–44)
MCH RBC QN AUTO: 30.4 PG (ref 26.1–32.9)
MCHC RBC AUTO-ENTMCNC: 32.3 G/DL (ref 31.4–35)
MCV RBC AUTO: 94.2 FL (ref 82–102)
MONOCYTES # BLD: 0.2 K/UL (ref 0.1–1.3)
MONOCYTES NFR BLD: 3 % (ref 4–12)
NEUTS SEG # BLD: 4.5 K/UL (ref 1.7–8.2)
NEUTS SEG NFR BLD: 70 % (ref 43–78)
NRBC # BLD: 0 K/UL (ref 0–0.2)
PLATELET # BLD AUTO: 362 K/UL (ref 150–450)
PMV BLD AUTO: 10.6 FL (ref 9.4–12.3)
POTASSIUM SERPL-SCNC: 4.4 MMOL/L (ref 3.5–5.1)
RBC # BLD AUTO: 4.5 M/UL (ref 4.05–5.2)
SODIUM SERPL-SCNC: 140 MMOL/L (ref 136–145)
WBC # BLD AUTO: 6.5 K/UL (ref 4.3–11.1)

## 2024-12-06 PROCEDURE — 6370000000 HC RX 637 (ALT 250 FOR IP)

## 2024-12-06 PROCEDURE — 6360000002 HC RX W HCPCS: Performed by: STUDENT IN AN ORGANIZED HEALTH CARE EDUCATION/TRAINING PROGRAM

## 2024-12-06 PROCEDURE — 6370000000 HC RX 637 (ALT 250 FOR IP): Performed by: STUDENT IN AN ORGANIZED HEALTH CARE EDUCATION/TRAINING PROGRAM

## 2024-12-06 PROCEDURE — 80048 BASIC METABOLIC PNL TOTAL CA: CPT

## 2024-12-06 PROCEDURE — 2580000003 HC RX 258: Performed by: NURSE PRACTITIONER

## 2024-12-06 PROCEDURE — 2580000003 HC RX 258: Performed by: STUDENT IN AN ORGANIZED HEALTH CARE EDUCATION/TRAINING PROGRAM

## 2024-12-06 PROCEDURE — 99222 1ST HOSP IP/OBS MODERATE 55: CPT | Performed by: INTERNAL MEDICINE

## 2024-12-06 PROCEDURE — 85025 COMPLETE CBC W/AUTO DIFF WBC: CPT

## 2024-12-06 PROCEDURE — 6370000000 HC RX 637 (ALT 250 FOR IP): Performed by: NURSE PRACTITIONER

## 2024-12-06 PROCEDURE — 36415 COLL VENOUS BLD VENIPUNCTURE: CPT

## 2024-12-06 PROCEDURE — 1100000003 HC PRIVATE W/ TELEMETRY

## 2024-12-06 PROCEDURE — 93010 ELECTROCARDIOGRAM REPORT: CPT | Performed by: INTERNAL MEDICINE

## 2024-12-06 RX ORDER — SUMATRIPTAN 50 MG/1
100 TABLET, FILM COATED ORAL PRN
Status: DISCONTINUED | OUTPATIENT
Start: 2024-12-06 | End: 2024-12-09 | Stop reason: HOSPADM

## 2024-12-06 RX ORDER — LEVOTHYROXINE SODIUM 100 UG/1
100 CAPSULE ORAL DAILY
Qty: 30 CAPSULE | Refills: 3 | Status: SHIPPED | OUTPATIENT
Start: 2024-12-06

## 2024-12-06 RX ORDER — 0.9 % SODIUM CHLORIDE 0.9 %
250 INTRAVENOUS SOLUTION INTRAVENOUS ONCE
Status: DISCONTINUED | OUTPATIENT
Start: 2024-12-06 | End: 2024-12-09 | Stop reason: HOSPADM

## 2024-12-06 RX ORDER — TRAZODONE HYDROCHLORIDE 50 MG/1
25 TABLET, FILM COATED ORAL NIGHTLY
Status: DISCONTINUED | OUTPATIENT
Start: 2024-12-06 | End: 2024-12-09 | Stop reason: HOSPADM

## 2024-12-06 RX ORDER — LAMOTRIGINE 25 MG/1
25 TABLET ORAL DAILY
Status: DISCONTINUED | OUTPATIENT
Start: 2024-12-07 | End: 2024-12-09 | Stop reason: HOSPADM

## 2024-12-06 RX ORDER — MIDODRINE HYDROCHLORIDE 5 MG/1
5 TABLET ORAL 3 TIMES DAILY
Status: DISCONTINUED | OUTPATIENT
Start: 2024-12-06 | End: 2024-12-09 | Stop reason: HOSPADM

## 2024-12-06 RX ORDER — FLUDROCORTISONE ACETATE 0.1 MG/1
0.1 TABLET ORAL DAILY
Status: DISCONTINUED | OUTPATIENT
Start: 2024-12-06 | End: 2024-12-06

## 2024-12-06 RX ORDER — MIDODRINE HYDROCHLORIDE 5 MG/1
5 TABLET ORAL 3 TIMES DAILY
Status: DISCONTINUED | OUTPATIENT
Start: 2024-12-06 | End: 2024-12-06

## 2024-12-06 RX ORDER — 0.9 % SODIUM CHLORIDE 0.9 %
250 INTRAVENOUS SOLUTION INTRAVENOUS ONCE
Status: COMPLETED | OUTPATIENT
Start: 2024-12-06 | End: 2024-12-06

## 2024-12-06 RX ORDER — CLONAZEPAM 0.5 MG/1
0.5 TABLET ORAL 3 TIMES DAILY PRN
Status: DISCONTINUED | OUTPATIENT
Start: 2024-12-06 | End: 2024-12-09 | Stop reason: HOSPADM

## 2024-12-06 RX ORDER — OXYCODONE AND ACETAMINOPHEN 5; 325 MG/1; MG/1
1 TABLET ORAL 2 TIMES DAILY
Status: DISCONTINUED | OUTPATIENT
Start: 2024-12-06 | End: 2024-12-09 | Stop reason: HOSPADM

## 2024-12-06 RX ORDER — SUMATRIPTAN 50 MG/1
100 TABLET, FILM COATED ORAL ONCE
Status: COMPLETED | OUTPATIENT
Start: 2024-12-06 | End: 2024-12-06

## 2024-12-06 RX ORDER — FLUDROCORTISONE ACETATE 0.1 MG/1
0.1 TABLET ORAL DAILY
Status: DISCONTINUED | OUTPATIENT
Start: 2024-12-07 | End: 2024-12-07

## 2024-12-06 RX ADMIN — CITALOPRAM HYDROBROMIDE 40 MG: 20 TABLET ORAL at 08:46

## 2024-12-06 RX ADMIN — SODIUM CHLORIDE, PRESERVATIVE FREE 10 ML: 5 INJECTION INTRAVENOUS at 08:48

## 2024-12-06 RX ADMIN — SUMATRIPTAN SUCCINATE 100 MG: 50 TABLET ORAL at 21:48

## 2024-12-06 RX ADMIN — PANTOPRAZOLE SODIUM 40 MG: 40 TABLET, DELAYED RELEASE ORAL at 05:40

## 2024-12-06 RX ADMIN — ONDANSETRON 4 MG: 2 INJECTION INTRAMUSCULAR; INTRAVENOUS at 04:30

## 2024-12-06 RX ADMIN — CLONAZEPAM 0.5 MG: 0.5 TABLET ORAL at 21:51

## 2024-12-06 RX ADMIN — ONDANSETRON 4 MG: 4 TABLET, ORALLY DISINTEGRATING ORAL at 21:49

## 2024-12-06 RX ADMIN — TRAZODONE HYDROCHLORIDE 25 MG: 50 TABLET ORAL at 21:50

## 2024-12-06 RX ADMIN — MIDODRINE HYDROCHLORIDE 5 MG: 5 TABLET ORAL at 15:04

## 2024-12-06 RX ADMIN — FLUDROCORTISONE ACETATE 0.1 MG: 0.1 TABLET ORAL at 10:42

## 2024-12-06 RX ADMIN — LEVOTHYROXINE SODIUM 100 MCG: 20 INJECTION, SOLUTION INTRAVENOUS at 04:26

## 2024-12-06 RX ADMIN — HYDROCORTISONE SODIUM SUCCINATE 100 MG: 100 INJECTION, POWDER, FOR SOLUTION INTRAMUSCULAR; INTRAVENOUS at 04:26

## 2024-12-06 RX ADMIN — SODIUM CHLORIDE, PRESERVATIVE FREE 5 ML: 5 INJECTION INTRAVENOUS at 20:00

## 2024-12-06 RX ADMIN — OXYCODONE HYDROCHLORIDE AND ACETAMINOPHEN 1 TABLET: 5; 325 TABLET ORAL at 19:59

## 2024-12-06 RX ADMIN — ENOXAPARIN SODIUM 40 MG: 100 INJECTION SUBCUTANEOUS at 08:46

## 2024-12-06 RX ADMIN — SODIUM CHLORIDE 250 ML: 9 INJECTION, SOLUTION INTRAVENOUS at 12:28

## 2024-12-06 RX ADMIN — MIDODRINE HYDROCHLORIDE 5 MG: 5 TABLET ORAL at 10:42

## 2024-12-06 RX ADMIN — ACETAMINOPHEN 650 MG: 325 TABLET ORAL at 02:19

## 2024-12-06 RX ADMIN — SUMATRIPTAN SUCCINATE 100 MG: 50 TABLET ORAL at 02:19

## 2024-12-06 RX ADMIN — HYDROCORTISONE SODIUM SUCCINATE 100 MG: 100 INJECTION, POWDER, FOR SOLUTION INTRAMUSCULAR; INTRAVENOUS at 20:01

## 2024-12-06 RX ADMIN — HYDROCORTISONE SODIUM SUCCINATE 100 MG: 100 INJECTION, POWDER, FOR SOLUTION INTRAMUSCULAR; INTRAVENOUS at 12:28

## 2024-12-06 RX ADMIN — MIDODRINE HYDROCHLORIDE 5 MG: 5 TABLET ORAL at 19:59

## 2024-12-06 ASSESSMENT — PAIN DESCRIPTION - DESCRIPTORS
DESCRIPTORS: ACHING;DISCOMFORT
DESCRIPTORS: SHARP
DESCRIPTORS: ACHING;SHARP

## 2024-12-06 ASSESSMENT — PAIN DESCRIPTION - LOCATION
LOCATION: HEAD
LOCATION: HEAD
LOCATION: GENERALIZED;HEAD

## 2024-12-06 ASSESSMENT — PAIN SCALES - GENERAL
PAINLEVEL_OUTOF10: 0
PAINLEVEL_OUTOF10: 0
PAINLEVEL_OUTOF10: 9
PAINLEVEL_OUTOF10: 9
PAINLEVEL_OUTOF10: 10

## 2024-12-06 ASSESSMENT — PAIN DESCRIPTION - PAIN TYPE: TYPE: ACUTE PAIN

## 2024-12-06 ASSESSMENT — PAIN SCALES - WONG BAKER: WONGBAKER_NUMERICALRESPONSE: NO HURT

## 2024-12-06 ASSESSMENT — PAIN DESCRIPTION - ORIENTATION
ORIENTATION: RIGHT;MID;UPPER
ORIENTATION: RIGHT
ORIENTATION: MID;RIGHT;UPPER

## 2024-12-06 NOTE — CONSULTS
See consult note from 12/06  
dry  Psychiatric: Normal mood and affect  Neurologic: Alert and oriented X 3      Recent Results (from the past 24 hour(s))   CBC with Auto Differential    Collection Time: 12/05/24  5:25 PM   Result Value Ref Range    WBC 8.6 4.3 - 11.1 K/uL    RBC 4.34 4.05 - 5.2 M/uL    Hemoglobin 13.3 11.7 - 15.4 g/dL    Hematocrit 41.9 35.8 - 46.3 %    MCV 96.5 82 - 102 FL    MCH 30.6 26.1 - 32.9 PG    MCHC 31.7 31.4 - 35.0 g/dL    RDW 12.8 11.9 - 14.6 %    Platelets 441 150 - 450 K/uL    MPV 10.5 9.4 - 12.3 FL    nRBC 0.00 0.0 - 0.2 K/uL    Differential Type AUTOMATED      Neutrophils % 50 43 - 78 %    Lymphocytes % 38 13 - 44 %    Monocytes % 8 4.0 - 12.0 %    Eosinophils % 3 0.5 - 7.8 %    Basophils % 1 0.0 - 2.0 %    Immature Granulocytes % 0 0.0 - 5.0 %    Neutrophils Absolute 4.4 1.7 - 8.2 K/UL    Lymphocytes Absolute 3.3 0.5 - 4.6 K/UL    Monocytes Absolute 0.7 0.1 - 1.3 K/UL    Eosinophils Absolute 0.2 0.0 - 0.8 K/UL    Basophils Absolute 0.1 0.0 - 0.2 K/UL    Immature Granulocytes Absolute 0.0 0.0 - 0.5 K/UL   CMP    Collection Time: 12/05/24  5:25 PM   Result Value Ref Range    Sodium 139 136 - 145 mmol/L    Potassium 3.8 3.5 - 5.1 mmol/L    Chloride 104 98 - 107 mmol/L    CO2 25 20 - 29 mmol/L    Anion Gap 9 7 - 16 mmol/L    Glucose 97 70 - 99 mg/dL    BUN 20 6 - 23 MG/DL    Creatinine 1.12 (H) 0.60 - 1.10 MG/DL    Est, Glom Filt Rate 65 >60 ml/min/1.73m2    Calcium 9.2 8.8 - 10.2 MG/DL    Total Bilirubin 0.7 0.0 - 1.2 MG/DL    ALT 35 8 - 45 U/L    AST 30 15 - 37 U/L    Alk Phosphatase 51 35 - 104 U/L    Total Protein 8.0 6.3 - 8.2 g/dL    Albumin 4.1 3.5 - 5.0 g/dL    Globulin 4.0 (H) 2.3 - 3.5 g/dL    Albumin/Globulin Ratio 1.0 1.0 - 1.9     Magnesium    Collection Time: 12/05/24  5:25 PM   Result Value Ref Range    Magnesium 2.5 (H) 1.8 - 2.4 mg/dL   Procalcitonin    Collection Time: 12/05/24  5:25 PM   Result Value Ref Range    Procalcitonin 0.10 0.00 - 0.10 ng/mL   Troponin    Collection Time: 12/05/24  5:25 PM

## 2024-12-06 NOTE — H&P
DAILY, 2 tablets    levothyroxine (SYNTHROID) 100 mcg, Oral, DAILY, Monday to Friday and 2 tablets each on Saturday and Sunday.    midodrine (PROAMATINE) 10 mg, Oral, 3 TIMES DAILY    naproxen (NAPROSYN) 500 mg, Oral, 2 TIMES DAILY PRN    omeprazole (PRILOSEC) 40 mg, Oral, DAILY    oxyCODONE-acetaminophen (PERCOCET) 5-325 MG per tablet 1 tablet, Oral, 2 TIMES DAILY    propranolol (INDERAL) 20 mg, Oral, PRN    Qulipta 60 mg, Oral, PRN    SUMAtriptan (IMITREX) 100 MG tablet PRN    traZODone (DESYREL) 25 mg, Oral, NIGHTLY       I have personally reviewed labs and tests:  Recent Results (from the past 24 hour(s))   CBC with Auto Differential    Collection Time: 12/05/24  5:25 PM   Result Value Ref Range    WBC 8.6 4.3 - 11.1 K/uL    RBC 4.34 4.05 - 5.2 M/uL    Hemoglobin 13.3 11.7 - 15.4 g/dL    Hematocrit 41.9 35.8 - 46.3 %    MCV 96.5 82 - 102 FL    MCH 30.6 26.1 - 32.9 PG    MCHC 31.7 31.4 - 35.0 g/dL    RDW 12.8 11.9 - 14.6 %    Platelets 441 150 - 450 K/uL    MPV 10.5 9.4 - 12.3 FL    nRBC 0.00 0.0 - 0.2 K/uL    Differential Type AUTOMATED      Neutrophils % 50 43 - 78 %    Lymphocytes % 38 13 - 44 %    Monocytes % 8 4.0 - 12.0 %    Eosinophils % 3 0.5 - 7.8 %    Basophils % 1 0.0 - 2.0 %    Immature Granulocytes % 0 0.0 - 5.0 %    Neutrophils Absolute 4.4 1.7 - 8.2 K/UL    Lymphocytes Absolute 3.3 0.5 - 4.6 K/UL    Monocytes Absolute 0.7 0.1 - 1.3 K/UL    Eosinophils Absolute 0.2 0.0 - 0.8 K/UL    Basophils Absolute 0.1 0.0 - 0.2 K/UL    Immature Granulocytes Absolute 0.0 0.0 - 0.5 K/UL   CMP    Collection Time: 12/05/24  5:25 PM   Result Value Ref Range    Sodium 139 136 - 145 mmol/L    Potassium 3.8 3.5 - 5.1 mmol/L    Chloride 104 98 - 107 mmol/L    CO2 25 20 - 29 mmol/L    Anion Gap 9 7 - 16 mmol/L    Glucose 97 70 - 99 mg/dL    BUN 20 6 - 23 MG/DL    Creatinine 1.12 (H) 0.60 - 1.10 MG/DL    Est, Glom Filt Rate 65 >60 ml/min/1.73m2    Calcium 9.2 8.8 - 10.2 MG/DL    Total Bilirubin 0.7 0.0 - 1.2 MG/DL    ALT

## 2024-12-06 NOTE — ED NOTES
TRANSFER - OUT REPORT:    Verbal report given to deon Herr on Laura Orellana  being transferred to [facility] for routine progression of patient care       Report consisted of patient's Situation, Background, Assessment and   Recommendations(SBAR).     Information from the following report(s) ED SBAR was reviewed with the receiving nurse.    Lines:   Peripheral IV 12/05/24 Left;Proximal Forearm (Active)       Peripheral IV 12/05/24 Right Antecubital (Active)   Site Assessment Clean, dry & intact 12/05/24 2051   Line Status Blood return noted 12/05/24 2051   Phlebitis Assessment No symptoms 12/05/24 2051   Infiltration Assessment 0 12/05/24 2051        Opportunity for questions and clarification was provided.      Patient transported with:  Registered Nurse      Anay Puga RN  12/05/24 2051

## 2024-12-07 LAB
T4 FREE SERPL-MCNC: 0.9 NG/DL (ref 0.9–1.7)
TROPONIN T SERPL HS-MCNC: <6 NG/L (ref 0–14)
TSH W FREE THYROID IF ABNORMAL: 4.61 UIU/ML (ref 0.27–4.2)

## 2024-12-07 PROCEDURE — 6360000002 HC RX W HCPCS: Performed by: STUDENT IN AN ORGANIZED HEALTH CARE EDUCATION/TRAINING PROGRAM

## 2024-12-07 PROCEDURE — 6370000000 HC RX 637 (ALT 250 FOR IP)

## 2024-12-07 PROCEDURE — 1100000003 HC PRIVATE W/ TELEMETRY

## 2024-12-07 PROCEDURE — 84484 ASSAY OF TROPONIN QUANT: CPT

## 2024-12-07 PROCEDURE — 97161 PT EVAL LOW COMPLEX 20 MIN: CPT

## 2024-12-07 PROCEDURE — 36415 COLL VENOUS BLD VENIPUNCTURE: CPT

## 2024-12-07 PROCEDURE — 93005 ELECTROCARDIOGRAM TRACING: CPT | Performed by: PHYSICIAN ASSISTANT

## 2024-12-07 PROCEDURE — 84443 ASSAY THYROID STIM HORMONE: CPT

## 2024-12-07 PROCEDURE — 84439 ASSAY OF FREE THYROXINE: CPT

## 2024-12-07 PROCEDURE — 6370000000 HC RX 637 (ALT 250 FOR IP): Performed by: STUDENT IN AN ORGANIZED HEALTH CARE EDUCATION/TRAINING PROGRAM

## 2024-12-07 PROCEDURE — 99232 SBSQ HOSP IP/OBS MODERATE 35: CPT | Performed by: INTERNAL MEDICINE

## 2024-12-07 PROCEDURE — 97530 THERAPEUTIC ACTIVITIES: CPT

## 2024-12-07 PROCEDURE — 2580000003 HC RX 258: Performed by: STUDENT IN AN ORGANIZED HEALTH CARE EDUCATION/TRAINING PROGRAM

## 2024-12-07 RX ORDER — FLUDROCORTISONE ACETATE 0.1 MG/1
0.1 TABLET ORAL ONCE
Status: COMPLETED | OUTPATIENT
Start: 2024-12-07 | End: 2024-12-07

## 2024-12-07 RX ORDER — FLUDROCORTISONE ACETATE 0.1 MG/1
0.2 TABLET ORAL DAILY
Status: DISCONTINUED | OUTPATIENT
Start: 2024-12-07 | End: 2024-12-07

## 2024-12-07 RX ORDER — FLUDROCORTISONE ACETATE 0.1 MG/1
0.2 TABLET ORAL DAILY
Status: DISCONTINUED | OUTPATIENT
Start: 2024-12-08 | End: 2024-12-09

## 2024-12-07 RX ADMIN — SODIUM CHLORIDE, PRESERVATIVE FREE 10 ML: 5 INJECTION INTRAVENOUS at 08:33

## 2024-12-07 RX ADMIN — LAMOTRIGINE 25 MG: 25 TABLET ORAL at 08:30

## 2024-12-07 RX ADMIN — SODIUM CHLORIDE, PRESERVATIVE FREE 5 ML: 5 INJECTION INTRAVENOUS at 21:55

## 2024-12-07 RX ADMIN — OXYCODONE HYDROCHLORIDE AND ACETAMINOPHEN 1 TABLET: 5; 325 TABLET ORAL at 08:27

## 2024-12-07 RX ADMIN — TRAZODONE HYDROCHLORIDE 25 MG: 50 TABLET ORAL at 21:55

## 2024-12-07 RX ADMIN — PANTOPRAZOLE SODIUM 40 MG: 40 TABLET, DELAYED RELEASE ORAL at 06:21

## 2024-12-07 RX ADMIN — ENOXAPARIN SODIUM 40 MG: 100 INJECTION SUBCUTANEOUS at 08:30

## 2024-12-07 RX ADMIN — LEVOTHYROXINE SODIUM 100 MCG: 20 INJECTION, SOLUTION INTRAVENOUS at 06:28

## 2024-12-07 RX ADMIN — HYDROCORTISONE SODIUM SUCCINATE 100 MG: 100 INJECTION, POWDER, FOR SOLUTION INTRAMUSCULAR; INTRAVENOUS at 06:20

## 2024-12-07 RX ADMIN — HYDROCORTISONE SODIUM SUCCINATE 100 MG: 100 INJECTION, POWDER, FOR SOLUTION INTRAMUSCULAR; INTRAVENOUS at 13:25

## 2024-12-07 RX ADMIN — OXYCODONE HYDROCHLORIDE AND ACETAMINOPHEN 1 TABLET: 5; 325 TABLET ORAL at 21:54

## 2024-12-07 RX ADMIN — ONDANSETRON 4 MG: 2 INJECTION INTRAMUSCULAR; INTRAVENOUS at 18:31

## 2024-12-07 RX ADMIN — FLUDROCORTISONE ACETATE 0.1 MG: 0.1 TABLET ORAL at 08:27

## 2024-12-07 RX ADMIN — CLONAZEPAM 0.5 MG: 0.5 TABLET ORAL at 23:01

## 2024-12-07 RX ADMIN — HYDROCORTISONE SODIUM SUCCINATE 100 MG: 100 INJECTION, POWDER, FOR SOLUTION INTRAMUSCULAR; INTRAVENOUS at 21:55

## 2024-12-07 RX ADMIN — CITALOPRAM HYDROBROMIDE 40 MG: 20 TABLET ORAL at 08:27

## 2024-12-07 RX ADMIN — FLUDROCORTISONE ACETATE 0.1 MG: 0.1 TABLET ORAL at 09:47

## 2024-12-07 RX ADMIN — SUMATRIPTAN SUCCINATE 100 MG: 50 TABLET ORAL at 18:31

## 2024-12-07 ASSESSMENT — PAIN SCALES - GENERAL
PAINLEVEL_OUTOF10: 0
PAINLEVEL_OUTOF10: 0
PAINLEVEL_OUTOF10: 7

## 2024-12-07 ASSESSMENT — PAIN DESCRIPTION - LOCATION
LOCATION: HEAD
LOCATION: HEAD

## 2024-12-07 NOTE — FLOWSHEET NOTE
12/07/24 0453   Treatment Team Notification   Reason for Communication Abnormal vitals  (BP 88/51 MAP 63 HR49)   Name of Team Member Notified Dr. Vasquez   Treatment Team Role Attending Provider   Method of Communication Secure Message   Response No new orders   Notification Time 5601

## 2024-12-07 NOTE — THERAPY EVALUATION
ACUTE PHYSICAL THERAPY GOALS:   (Developed with and agreed upon by patient and/or caregiver.)    While maintaining stable vitals signs:  (1.) Laura Orellana will transfer from bed to chair and chair to bed with INDEPENDENCE using the least restrictive device within 7 treatment day(s).    (2.) Laura Orellana will ambulate with INDEPENDENCE for 500 feet with the least restrictive device within 7 treatment day(s).   (3.) Laura Orellana will perform standing static and dynamic balance activities x 15 minutes with INDEPENDENCE to improve safety within 7 treatment day(s).  (4.) Laura Orellana will ascend and descend 3 stairs using 1 hand rail(s) with INDEPENDENCE to improve functional mobility and safety within 7 treatment day(s).  (5.) Laura Orellana will perform therapeutic exercises x 15 min for HEP with INDEPENDENCE to improve strength, endurance, and functional mobility within 7 treatment day(s).      PHYSICAL THERAPY Initial Assessment, Daily Note, and AM  (Link to Caseload Tracking: PT Visit Days : 1  Acknowledge Orders  Time In/Out  PT Charge Capture  Rehab Caseload Tracker    Laura Orellana is a 34 y.o. female   PRIMARY DIAGNOSIS: Bradycardia  Bradycardia [R00.1]  Symptomatic bradycardia [R00.1]       Reason for Referral: Generalized Muscle Weakness (M62.81)  Difficulty in walking, Not elsewhere classified (R26.2)  Inpatient: Payor: [place] MEDICA[place] / Plan: [place] [employer]ICA[place] / Product Type: *No Product type* /     ASSESSMENT:     REHAB RECOMMENDATIONS:   Recommendation to date pending progress:  Setting:  No further skilled physical therapy after discharge from hospital    Equipment:    None     ASSESSMENT:  Ms. Orellana is a 34 year old female who presents with low symptomatic bradycardia, weakness, and fatigue. She has history of POTS, bipolar disorder, septal defect, hypothyroidism, and Crohns Disease. At baseline she lives with her 3 young children and her boyfriend. She is independent with

## 2024-12-08 LAB
EKG ATRIAL RATE: 36 BPM
EKG DIAGNOSIS: NORMAL
EKG P AXIS: 54 DEGREES
EKG P-R INTERVAL: 122 MS
EKG Q-T INTERVAL: 506 MS
EKG QRS DURATION: 92 MS
EKG QTC CALCULATION (BAZETT): 391 MS
EKG R AXIS: 15 DEGREES
EKG T AXIS: 33 DEGREES
EKG VENTRICULAR RATE: 36 BPM

## 2024-12-08 PROCEDURE — 99232 SBSQ HOSP IP/OBS MODERATE 35: CPT | Performed by: INTERNAL MEDICINE

## 2024-12-08 PROCEDURE — 1100000003 HC PRIVATE W/ TELEMETRY

## 2024-12-08 PROCEDURE — 6370000000 HC RX 637 (ALT 250 FOR IP): Performed by: STUDENT IN AN ORGANIZED HEALTH CARE EDUCATION/TRAINING PROGRAM

## 2024-12-08 PROCEDURE — 93010 ELECTROCARDIOGRAM REPORT: CPT | Performed by: INTERNAL MEDICINE

## 2024-12-08 PROCEDURE — 6360000002 HC RX W HCPCS: Performed by: STUDENT IN AN ORGANIZED HEALTH CARE EDUCATION/TRAINING PROGRAM

## 2024-12-08 PROCEDURE — 6360000002 HC RX W HCPCS

## 2024-12-08 PROCEDURE — 6370000000 HC RX 637 (ALT 250 FOR IP)

## 2024-12-08 PROCEDURE — 2580000003 HC RX 258: Performed by: STUDENT IN AN ORGANIZED HEALTH CARE EDUCATION/TRAINING PROGRAM

## 2024-12-08 RX ORDER — DIPHENHYDRAMINE HYDROCHLORIDE 50 MG/ML
25 INJECTION INTRAMUSCULAR; INTRAVENOUS ONCE
Status: COMPLETED | OUTPATIENT
Start: 2024-12-08 | End: 2024-12-08

## 2024-12-08 RX ORDER — KETOROLAC TROMETHAMINE 15 MG/ML
15 INJECTION, SOLUTION INTRAMUSCULAR; INTRAVENOUS ONCE
Status: COMPLETED | OUTPATIENT
Start: 2024-12-08 | End: 2024-12-08

## 2024-12-08 RX ORDER — HYDROCORTISONE SODIUM SUCCINATE 100 MG/2ML
100 INJECTION INTRAMUSCULAR; INTRAVENOUS DAILY
Status: DISCONTINUED | OUTPATIENT
Start: 2024-12-09 | End: 2024-12-09

## 2024-12-08 RX ORDER — PROCHLORPERAZINE EDISYLATE 5 MG/ML
5 INJECTION INTRAMUSCULAR; INTRAVENOUS ONCE
Status: COMPLETED | OUTPATIENT
Start: 2024-12-08 | End: 2024-12-08

## 2024-12-08 RX ADMIN — DIPHENHYDRAMINE HYDROCHLORIDE 25 MG: 50 INJECTION INTRAMUSCULAR; INTRAVENOUS at 14:44

## 2024-12-08 RX ADMIN — HYDROCORTISONE SODIUM SUCCINATE 100 MG: 100 INJECTION, POWDER, FOR SOLUTION INTRAMUSCULAR; INTRAVENOUS at 12:50

## 2024-12-08 RX ADMIN — CITALOPRAM HYDROBROMIDE 40 MG: 20 TABLET ORAL at 09:14

## 2024-12-08 RX ADMIN — TRAZODONE HYDROCHLORIDE 25 MG: 50 TABLET ORAL at 21:24

## 2024-12-08 RX ADMIN — CLONAZEPAM 0.5 MG: 0.5 TABLET ORAL at 23:29

## 2024-12-08 RX ADMIN — PANTOPRAZOLE SODIUM 40 MG: 40 TABLET, DELAYED RELEASE ORAL at 05:29

## 2024-12-08 RX ADMIN — HYDROCORTISONE SODIUM SUCCINATE 100 MG: 100 INJECTION, POWDER, FOR SOLUTION INTRAMUSCULAR; INTRAVENOUS at 05:29

## 2024-12-08 RX ADMIN — ONDANSETRON 4 MG: 2 INJECTION INTRAMUSCULAR; INTRAVENOUS at 12:50

## 2024-12-08 RX ADMIN — LEVOTHYROXINE SODIUM 100 MCG: 20 INJECTION, SOLUTION INTRAVENOUS at 05:29

## 2024-12-08 RX ADMIN — LAMOTRIGINE 25 MG: 25 TABLET ORAL at 09:14

## 2024-12-08 RX ADMIN — FLUDROCORTISONE ACETATE 0.2 MG: 0.1 TABLET ORAL at 09:14

## 2024-12-08 RX ADMIN — CLONAZEPAM 0.5 MG: 0.5 TABLET ORAL at 12:04

## 2024-12-08 RX ADMIN — KETOROLAC TROMETHAMINE 15 MG: 15 INJECTION, SOLUTION INTRAMUSCULAR; INTRAVENOUS at 14:44

## 2024-12-08 RX ADMIN — SUMATRIPTAN SUCCINATE 100 MG: 50 TABLET ORAL at 12:50

## 2024-12-08 RX ADMIN — OXYCODONE HYDROCHLORIDE AND ACETAMINOPHEN 1 TABLET: 5; 325 TABLET ORAL at 09:14

## 2024-12-08 RX ADMIN — SODIUM CHLORIDE, PRESERVATIVE FREE 5 ML: 5 INJECTION INTRAVENOUS at 21:24

## 2024-12-08 RX ADMIN — ENOXAPARIN SODIUM 40 MG: 100 INJECTION SUBCUTANEOUS at 09:24

## 2024-12-08 RX ADMIN — OXYCODONE HYDROCHLORIDE AND ACETAMINOPHEN 1 TABLET: 5; 325 TABLET ORAL at 21:24

## 2024-12-08 RX ADMIN — SODIUM CHLORIDE, PRESERVATIVE FREE 10 ML: 5 INJECTION INTRAVENOUS at 09:24

## 2024-12-08 RX ADMIN — PROCHLORPERAZINE EDISYLATE 5 MG: 5 INJECTION INTRAMUSCULAR; INTRAVENOUS at 14:43

## 2024-12-08 ASSESSMENT — PAIN SCALES - GENERAL
PAINLEVEL_OUTOF10: 7
PAINLEVEL_OUTOF10: 0

## 2024-12-08 ASSESSMENT — PAIN DESCRIPTION - LOCATION: LOCATION: HEAD

## 2024-12-08 NOTE — PLAN OF CARE
Problem: Discharge Planning  Goal: Discharge to home or other facility with appropriate resources  12/8/2024 0325 by Leesa Padron RN  Outcome: Progressing  Flowsheets (Taken 12/7/2024 1950)  Discharge to home or other facility with appropriate resources: Identify barriers to discharge with patient and caregiver  12/7/2024 1459 by El Jarquin, RN  Outcome: Progressing     Problem: Pain  Goal: Verbalizes/displays adequate comfort level or baseline comfort level  12/8/2024 0325 by Leesa Padron, RN  Outcome: Progressing  12/7/2024 1459 by El Jarquin, RN  Outcome: Progressing     Problem: Safety - Adult  Goal: Free from fall injury  12/8/2024 0325 by Leesa Padron RN  Outcome: Progressing  Flowsheets (Taken 12/7/2024 1950)  Free From Fall Injury: Instruct family/caregiver on patient safety  12/7/2024 1459 by El Jarquin, RN  Outcome: Progressing

## 2024-12-08 NOTE — PLAN OF CARE
Problem: Discharge Planning  Goal: Discharge to home or other facility with appropriate resources  12/8/2024 1112 by El Jarquin, RN  Outcome: Progressing  12/8/2024 0325 by Leesa Padron, RN  Outcome: Progressing  Flowsheets (Taken 12/7/2024 1950)  Discharge to home or other facility with appropriate resources: Identify barriers to discharge with patient and caregiver     Problem: Pain  Goal: Verbalizes/displays adequate comfort level or baseline comfort level  12/8/2024 1112 by El Jarquin, RN  Outcome: Progressing  12/8/2024 0325 by Leesa Padron, RN  Outcome: Progressing     Problem: Safety - Adult  Goal: Free from fall injury  12/8/2024 1112 by El Jarquin, RN  Outcome: Progressing  12/8/2024 0325 by Leesa Padron, RN  Outcome: Progressing  Flowsheets (Taken 12/7/2024 1950)  Free From Fall Injury: Instruct family/caregiver on patient safety

## 2024-12-09 ENCOUNTER — TELEPHONE (OUTPATIENT)
Dept: ENDOCRINOLOGY | Age: 34
End: 2024-12-09

## 2024-12-09 VITALS
DIASTOLIC BLOOD PRESSURE: 88 MMHG | SYSTOLIC BLOOD PRESSURE: 109 MMHG | HEART RATE: 52 BPM | RESPIRATION RATE: 17 BRPM | HEIGHT: 63 IN | WEIGHT: 122 LBS | BODY MASS INDEX: 21.62 KG/M2 | TEMPERATURE: 97 F | OXYGEN SATURATION: 97 %

## 2024-12-09 DIAGNOSIS — E03.9 ACQUIRED HYPOTHYROIDISM: Primary | ICD-10-CM

## 2024-12-09 PROCEDURE — 99232 SBSQ HOSP IP/OBS MODERATE 35: CPT | Performed by: INTERNAL MEDICINE

## 2024-12-09 PROCEDURE — 6360000002 HC RX W HCPCS

## 2024-12-09 PROCEDURE — 6370000000 HC RX 637 (ALT 250 FOR IP)

## 2024-12-09 PROCEDURE — 6370000000 HC RX 637 (ALT 250 FOR IP): Performed by: STUDENT IN AN ORGANIZED HEALTH CARE EDUCATION/TRAINING PROGRAM

## 2024-12-09 PROCEDURE — 2580000003 HC RX 258: Performed by: STUDENT IN AN ORGANIZED HEALTH CARE EDUCATION/TRAINING PROGRAM

## 2024-12-09 PROCEDURE — 6360000002 HC RX W HCPCS: Performed by: STUDENT IN AN ORGANIZED HEALTH CARE EDUCATION/TRAINING PROGRAM

## 2024-12-09 RX ORDER — FLUDROCORTISONE ACETATE 0.1 MG/1
0.1 TABLET ORAL DAILY
Status: DISCONTINUED | OUTPATIENT
Start: 2024-12-10 | End: 2024-12-09 | Stop reason: HOSPADM

## 2024-12-09 RX ORDER — DROXIDOPA 100 MG/1
100 CAPSULE ORAL 3 TIMES DAILY
Qty: 90 CAPSULE | Refills: 0 | Status: SHIPPED | OUTPATIENT
Start: 2024-12-09

## 2024-12-09 RX ORDER — HYDROCORTISONE SODIUM SUCCINATE 100 MG/2ML
100 INJECTION INTRAMUSCULAR; INTRAVENOUS DAILY
Status: COMPLETED | OUTPATIENT
Start: 2024-12-09 | End: 2024-12-09

## 2024-12-09 RX ADMIN — PANTOPRAZOLE SODIUM 40 MG: 40 TABLET, DELAYED RELEASE ORAL at 05:59

## 2024-12-09 RX ADMIN — CITALOPRAM HYDROBROMIDE 40 MG: 20 TABLET ORAL at 08:30

## 2024-12-09 RX ADMIN — HYDROCORTISONE SODIUM SUCCINATE 100 MG: 100 INJECTION, POWDER, FOR SOLUTION INTRAMUSCULAR; INTRAVENOUS at 08:31

## 2024-12-09 RX ADMIN — LAMOTRIGINE 25 MG: 25 TABLET ORAL at 08:31

## 2024-12-09 RX ADMIN — CLONAZEPAM 0.5 MG: 0.5 TABLET ORAL at 09:13

## 2024-12-09 RX ADMIN — ENOXAPARIN SODIUM 40 MG: 100 INJECTION SUBCUTANEOUS at 08:31

## 2024-12-09 RX ADMIN — SODIUM CHLORIDE, PRESERVATIVE FREE 10 ML: 5 INJECTION INTRAVENOUS at 08:32

## 2024-12-09 RX ADMIN — LEVOTHYROXINE SODIUM 100 MCG: 20 INJECTION, SOLUTION INTRAVENOUS at 05:59

## 2024-12-09 RX ADMIN — FLUDROCORTISONE ACETATE 0.2 MG: 0.1 TABLET ORAL at 08:30

## 2024-12-09 RX ADMIN — OXYCODONE HYDROCHLORIDE AND ACETAMINOPHEN 1 TABLET: 5; 325 TABLET ORAL at 08:30

## 2024-12-09 ASSESSMENT — PAIN SCALES - GENERAL
PAINLEVEL_OUTOF10: 0
PAINLEVEL_OUTOF10: 0

## 2024-12-09 NOTE — PROGRESS NOTES
[facility] CARDIOLOGY PROGRESS NOTE           12/9/2024 2:23 PM    Admit Date: 12/5/2024    Admit Diagnosis: Bradycardia [R00.1]  Symptomatic bradycardia [R00.1]      Subjective:   No complaints this AM, no chest pain or shortness of breath    Interval History: (History of pertinent interval events obtained from nursing staff)    ROS:  GEN:  No fever or chills  Cardiovascular:  As noted above  Pulmonary:  As noted above  Neuro:  No new focal motor or sensory loss      Objective:     Vitals:    12/09/24 0420 12/09/24 0559 12/09/24 0736 12/09/24 1207   BP:  125/84 111/76 109/88   Pulse: (!) 42 52 (!) 49 52   Resp:   17    Temp:   97 °F (36.1 °C)    TempSrc:   Temporal    SpO2:   98% 97%   Weight:       Height:           Physical Exam:  General-Well Developed, Well Nourished, No Acute Distress, Alert & Oriented x 3, appropriate mood.  Neck- supple, no JVD  CV- regular rate and rhythm no MRG  Lung- clear bilaterally  Abd- soft, nontender, nondistended  Ext- no edema bilaterally.  Skin- warm and dry    Current Facility-Administered Medications   Medication Dose Route Frequency    fludrocortisone (FLORINEF) tablet 0.2 mg  0.2 mg Oral Daily    [Held by provider] midodrine (PROAMATINE) tablet 5 mg  5 mg Oral TID    traZODone (DESYREL) tablet 25 mg  25 mg Oral Nightly    clonazePAM (KLONOPIN) tablet 0.5 mg  0.5 mg Oral TID PRN    Atogepant TABS 60 mg (Patient Supplied)  60 mg Oral PRN    lamoTRIgine (LAMICTAL) tablet 25 mg  25 mg Oral Daily    oxyCODONE-acetaminophen (PERCOCET) 5-325 MG per tablet 1 tablet  1 tablet Oral BID    sodium chloride 0.9 % bolus 250 mL  250 mL IntraVENous Once    SUMAtriptan (IMITREX) tablet 100 mg  100 mg Oral PRN    amphetamine-dextroamphetamine (ADDERALL XR) capsule 30 mg- patient supplied controlled substance (Patient Supplied)  30 mg Oral QAM    citalopram (CELEXA) tablet 40 mg  40 mg Oral Daily    pantoprazole (PROTONIX) tablet 40 mg  40 mg Oral QAM AC    sodium chloride 
       Hospitalist Progress Note   Admit Date:  2024  5:16 PM   Name:  Laura Orellana   Age:  34 y.o.  Sex:  female  :  1990   MRN:  [id number]   Room:  /    Presenting/Chief Complaint: Bradycardia     Reason(s) for Admission: Bradycardia [R00.1]  Symptomatic bradycardia [R00.1]     Hospital Course:   Please refer to admission H&P for further details    In summary, Laura Orellana is a 34 y.o. female with medical history of POTS, bipolar disorder, GERD, Crohn's disease, hypothyroidism, atrial septal defect, and intermittent bradycardia who presented with generalized weakness, fatigue, shortness of breath, intermittent chest discomfort, and notable bradycardia at home. Admitted for severe hypothyroidism (concerning for possible myxedema coma).  Cardiology was consulted given significant bradycardia.  Patient started on  mcg of Synthroid and hydrocortisone 100 mg every 8 hours.  Endocrinology consulted to help guide management. I do not anticipate rehab upon discharge.    Subjective & 24hr Events:   No overnight events.  Cardiology following.  Still bradycardic to the 30s and 40s  Denies chest pain or shortness of breath.    Assessment & Plan:     Severe hypothyroidism  -TSH 56, free T4 of 0.6 on admission  -Synthroid 100 mcg 5 days/week and 200 mcg 2 days a week.  -Continue 100 mcg of IV Synthroid  -Spot cortisol 9.6  -100 mg Solu-Cortef every 8 hours  -Repeat TSH on   -Endocrinology consulted. Recommends 200 mcg daily upon discharge and will follow-up with patient in the outpatient setting.     Symptomatic bradycardia  -Possibly secondary to severe hypothyroidism  -Heart rate in the 30s, pacer pads in place  -Cardiology consulted  -Orthostatics normal  -Will give atropine if hypotension or worsening bradycardia     Questionable POTS  Autonomic nerve dysfunction  -Resume lower dose midodrine to prevent bradycardia  -Started 0.2 mg fludrocortisone daily  -Droxidopa/Northera instead of midodrine 
       Hospitalist Progress Note   Admit Date:  2024  5:16 PM   Name:  aLura Orellana   Age:  34 y.o.  Sex:  female  :  1990   MRN:  [id number]   Room:  [facility]/    Presenting/Chief Complaint: Bradycardia     Reason(s) for Admission: Bradycardia [R00.1]  Symptomatic bradycardia [R00.1]     Hospital Course:   Please refer to admission H&P for further details    In summary, Laura Orellana is a 34 y.o. female with medical history of POTS, bipolar disorder, GERD, Crohn's disease, hypothyroidism, atrial septal defect, and intermittent bradycardia who presented with generalized weakness, fatigue, shortness of breath, intermittent chest discomfort, and notable bradycardia at home. Admitted for severe hypothyroidism (concerning for possible myxedema coma).  Cardiology was consulted given significant bradycardia.  Patient started on  mcg of Synthroid and hydrocortisone 100 mg every 8 hours.  Endocrinology consulted to help guide management. I do not anticipate rehab upon discharge.    Subjective & 24hr Events:   No overnight events.  Heart rate better controlled. Cardiology following.  Endocrinology consulted.  Agrees with current plan.  Denies chest pain or shortness of breath.    Assessment & Plan:     Severe hypothyroidism  -TSH 56, free T4 of 0.6 on admission  -Synthroid 100 mcg 5 days/week and 200 mcg 2 days a week.  -Continue 100 mcg of IV Synthroid  -Spot cortisol 9.6  -100 mg Solu-Cortef every 8 hours  -Repeat TSH on   -Endocrinology consulted. Recommends 200 mcg daily upon discharge and will follow-up with patient in the outpatient setting.     Symptomatic bradycardia  -Possibly secondary to severe hypothyroidism  -Heart rate in the 30s, pacer pads in place  -Cardiology consulted  -Will give atropine if hypotension or worsening bradycardia     POTS  -Resume lower dose midodrine to prevent bradycardia  -Resume home fludrocortisone daily     History of Crohn's disease  -Concern there may be absorption 
   12/09/24 1200   Resting (Room Air)   SpO2 97   HR 53   During Walk (Room Air)   SpO2 95   HR 59   Walk/Assistance Device Ambulation   Rate of Dyspnea 0   Symptoms Fatigue   After Walk   SpO2 97   HR 52   O2 Flow Rate (l/min) 0 l/min   Rate of Dyspnea 0   Symptoms Fatigue   Does the Patient Qualify for Home O2 No   Does the Patient Need Portable Oxygen Tanks No     Patient ambulated from room to end of hallway and back with no complications noted.  
   [facility] CARDIOLOGY PROGRESS NOTE    12/7/2024 8:58 AM    Admit Date: 12/5/2024        Subjective:   Stable overnight without angina, CHF, or palpitations.  Remains mildly to moderately bradycardic with marginal BP but asymptomatic lying in bed.  No other complaints overnight. Tolerating meds well.     Objective:      Vitals:    12/07/24 0500 12/07/24 0530 12/07/24 0600 12/07/24 0824   BP: (!) 90/55 (!) 89/62 (!) 95/52 (!) 142/91   Pulse: (!) 47 50 (!) 46 (!) 40   Resp:    15   Temp:    98.4 °F (36.9 °C)   TempSrc:    Oral   SpO2:    100%   Weight:       Height:           Physical Exam:  Neck- supple, no JVD  CV- regular rate and rhythm no MRG  Lung- clear bilaterally  Abd- soft, nontender, nondistended  Ext- no edema  Skin- warm and dry    Data Review:   Pertinent lab and noninvasive imaging over the past 24 hours reviewed and evaluated.    Recent Labs     12/05/24  1725 12/06/24  0448    140   K 3.8 4.4   MG 2.5*  --    BUN 20 16   WBC 8.6 6.5   HGB 13.3 13.7   HCT 41.9 42.4    362     No results found for: \"LDL\", \"LDLDIRECT\"    Assessment and Plan:     Active Hospital Problems    *Severe sinus bradycardia-multifactorial.  Treat very high TSH with IV Synthroid.  Absorption may be an issue with Crohn's disease with oral medication?  In addition to IV Synthroid, try to decrease midodrine to 5 mg 3 times daily if blood pressure will allow to see if this creates less of a baroreceptor reflex and will improve her resting sinus rhythm.  Follow closely.      POTS (postural orthostatic tachycardia syndrome)-continue midodrine but try to decrease to 5 mg 3 times daily, continue Florinef and aggressive salt and fluid loading on a daily basis-I walked in today to find a Pepsi and 3 Mountain Dews on the patient's bedside table.  She drinks an excessive amount of caffeine at home and this may be exacerbating her volume issues.  Counseled regarding complete cessation of caffeine and drinking 80 to 100 ounces of 
   [facility] CARDIOLOGY PROGRESS NOTE    12/8/2024 8:49 AM    Admit Date: 12/5/2024        Subjective:   Stable overnight without angina, CHF, or palpitations.  Remains mildly to moderately bradycardic with marginal BP but asymptomatic lying in bed.  No other complaints overnight. Tolerating meds well.     Objective:      Vitals:    12/08/24 0005 12/08/24 0350 12/08/24 0430 12/08/24 0839   BP:  117/76  103/62   Pulse: (!) 45 (!) 41 (!) 38 (!) 48   Resp:  14  15   Temp:  98.2 °F (36.8 °C)  98.2 °F (36.8 °C)   TempSrc:  Oral  Oral   SpO2:  98%  99%   Weight:       Height:           Physical Exam:  Neck- supple, no JVD  CV- regular rate and rhythm no MRG  Lung- clear bilaterally  Abd- soft, nontender, nondistended  Ext- no edema  Skin- warm and dry    Data Review:   Pertinent lab and noninvasive imaging over the past 24 hours reviewed and evaluated.    Recent Labs     12/05/24  1725 12/06/24  0448    140   K 3.8 4.4   MG 2.5*  --    BUN 20 16   WBC 8.6 6.5   HGB 13.3 13.7   HCT 41.9 42.4    362     No results found for: \"LDL\", \"LDLDIRECT\"    Assessment and Plan:     Active Hospital Problems    *Severe sinus bradycardia-multifactorial.  Treat very high TSH with IV Synthroid.  Absorption may be an issue with Crohn's disease with oral medication?  In addition to IV Synthroid, we decreased midodrine to 5 mg 3 times daily if blood pressure will allow to see if this creates less of a baroreceptor reflex and will improve her resting sinus rhythm.  Follow closely.  See below      POTS (postural orthostatic tachycardia syndrome)-questionable diagnosis-she has sinus bradycardia and hypotension lying flat in bed and postural symptoms with low BP when standing.  Will check orthostatics again today and tomorrow morning.  If she has postural hypotension she does not have POTS and may have an element of autonomic failure/Shy-Drager?  She drinks an excessive amount of caffeine at home and this may be exacerbating her volume 
 offered spiritual care visit with patient. Patient declined a visit at this time.  
4 Eyes Skin Assessment     NAME:  Laura Orellana  YOB: 1990  MEDICAL RECORD NUMBER:  [id number]    The patient is being assessed for  Admission    I agree that at least one RN has performed a thorough Head to Toe Skin Assessment on the patient. ALL assessment sites listed below have been assessed.      Areas assessed by both nurses:    Head, Face, Ears, Shoulders, Back, Chest, Arms, Elbows, Hands, Sacrum. Buttock, Coccyx, Ischium, Legs. Feet and Heels, and Under Medical Devices         Does the Patient have a Wound? No noted wound(s)       Quinn Prevention initiated by RN: Yes  Wound Care Orders initiated by RN: No    Pressure Injury (Stage 3,4, Unstageable, DTI, NWPT, and Complex wounds) if present, place Wound referral order by RN under : No    New Ostomies, if present place, Ostomy referral order under : No     Nurse 1 eSignature: Electronically signed by Carmenza Hayes RN on 12/6/24 at 1:04 AM EST    **SHARE this note so that the co-signing nurse can place an eSignature**    Nurse 2 eSignature: Electronically signed by Radha Hou RN on 12/6/24 at 1:58 AM EST   
Patient given discharge instructions. IV and tele monitor removed.  
TRANSFER - IN REPORT:    Verbal report received from MAYNOR Cueva on Laura Orellana  being received from ED for routine progression of patient care      Report consisted of patient's Situation, Background, Assessment and   Recommendations(SBAR).     Information from the following report(s) Nurse Handoff Report, Index, ED Encounter Summary, ED SBAR, Adult Overview, Intake/Output, MAR, Recent Results, Cardiac Rhythm SB, and Neuro Assessment was reviewed with the receiving nurse.    Opportunity for questions and clarification was provided.      Assessment completed upon patient's arrival to unit and care assumed.    
  Lactate, Sepsis    Collection Time: 12/05/24  6:00 PM   Result Value Ref Range    Lactic Acid, Sepsis 0.5 0.5 - 2.0 MMOL/L   Troponin    Collection Time: 12/05/24  7:16 PM   Result Value Ref Range    Troponin T <6.0 0 - 14 ng/L   Cortisol, Baseline    Collection Time: 12/05/24  9:24 PM   Result Value Ref Range    Cortisol 9.6 ug/dL   CBC with Auto Differential    Collection Time: 12/06/24  4:48 AM   Result Value Ref Range    WBC 6.5 4.3 - 11.1 K/uL    RBC 4.50 4.05 - 5.2 M/uL    Hemoglobin 13.7 11.7 - 15.4 g/dL    Hematocrit 42.4 35.8 - 46.3 %    MCV 94.2 82 - 102 FL    MCH 30.4 26.1 - 32.9 PG    MCHC 32.3 31.4 - 35.0 g/dL    RDW 12.8 11.9 - 14.6 %    Platelets 362 150 - 450 K/uL    MPV 10.6 9.4 - 12.3 FL    nRBC 0.00 0.0 - 0.2 K/uL    Differential Type AUTOMATED      Neutrophils % 70 43 - 78 %    Lymphocytes % 27 13 - 44 %    Monocytes % 3 (L) 4.0 - 12.0 %    Eosinophils % 0 (L) 0.5 - 7.8 %    Basophils % 0 0.0 - 2.0 %    Immature Granulocytes % 0 0.0 - 5.0 %    Neutrophils Absolute 4.5 1.7 - 8.2 K/UL    Lymphocytes Absolute 1.7 0.5 - 4.6 K/UL    Monocytes Absolute 0.2 0.1 - 1.3 K/UL    Eosinophils Absolute 0.0 0.0 - 0.8 K/UL    Basophils Absolute 0.0 0.0 - 0.2 K/UL    Immature Granulocytes Absolute 0.0 0.0 - 0.5 K/UL   Basic Metabolic Panel    Collection Time: 12/06/24  4:48 AM   Result Value Ref Range    Sodium 140 136 - 145 mmol/L    Potassium 4.4 3.5 - 5.1 mmol/L    Chloride 105 98 - 107 mmol/L    CO2 25 20 - 29 mmol/L    Anion Gap 10 7 - 16 mmol/L    Glucose 128 (H) 70 - 99 mg/dL    BUN 16 6 - 23 MG/DL    Creatinine 0.95 0.60 - 1.10 MG/DL    Est, Glom Filt Rate 80 >60 ml/min/1.73m2    Calcium 9.1 8.8 - 10.2 MG/DL       No results for input(s): \"COVID19\" in the last 72 hours.    Current Meds:  Current Facility-Administered Medications   Medication Dose Route Frequency    hydrocortisone sodium succinate PF (SOLU-CORTEF) injection 100 mg  100 mg IntraVENous Q8H    amphetamine-dextroamphetamine (ADDERALL XR) 
Collection Time: 12/05/24  5:25 PM   Result Value Ref Range    T4 Free 0.6 (L) 0.9 - 1.7 NG/DL   EKG 12 Lead    Collection Time: 12/05/24  5:25 PM   Result Value Ref Range    Ventricular Rate 34 BPM    Atrial Rate 34 BPM    P-R Interval 133 ms    QRS Duration 104 ms    Q-T Interval 572 ms    QTc Calculation (Bazett) 431 ms    P Axis 65 degrees    R Axis 17 degrees    T Axis 45 degrees    Diagnosis       Sinus bradycardia with prominent U wave  RSR' in V1 or V2, probably normal variant  ST elev, probable normal early repol pattern    Confirmed by GREGG CARCAMO ()MAINOR ([id number]) on 12/6/2024 5:32:26 AM     Lactate, Sepsis    Collection Time: 12/05/24  6:00 PM   Result Value Ref Range    Lactic Acid, Sepsis 0.5 0.5 - 2.0 MMOL/L   Troponin    Collection Time: 12/05/24  7:16 PM   Result Value Ref Range    Troponin T <6.0 0 - 14 ng/L   Cortisol, Baseline    Collection Time: 12/05/24  9:24 PM   Result Value Ref Range    Cortisol 9.6 ug/dL   CBC with Auto Differential    Collection Time: 12/06/24  4:48 AM   Result Value Ref Range    WBC 6.5 4.3 - 11.1 K/uL    RBC 4.50 4.05 - 5.2 M/uL    Hemoglobin 13.7 11.7 - 15.4 g/dL    Hematocrit 42.4 35.8 - 46.3 %    MCV 94.2 82 - 102 FL    MCH 30.4 26.1 - 32.9 PG    MCHC 32.3 31.4 - 35.0 g/dL    RDW 12.8 11.9 - 14.6 %    Platelets 362 150 - 450 K/uL    MPV 10.6 9.4 - 12.3 FL    nRBC 0.00 0.0 - 0.2 K/uL    Differential Type AUTOMATED      Neutrophils % 70 43 - 78 %    Lymphocytes % 27 13 - 44 %    Monocytes % 3 (L) 4.0 - 12.0 %    Eosinophils % 0 (L) 0.5 - 7.8 %    Basophils % 0 0.0 - 2.0 %    Immature Granulocytes % 0 0.0 - 5.0 %    Neutrophils Absolute 4.5 1.7 - 8.2 K/UL    Lymphocytes Absolute 1.7 0.5 - 4.6 K/UL    Monocytes Absolute 0.2 0.1 - 1.3 K/UL    Eosinophils Absolute 0.0 0.0 - 0.8 K/UL    Basophils Absolute 0.0 0.0 - 0.2 K/UL    Immature Granulocytes Absolute 0.0 0.0 - 0.5 K/UL   Basic Metabolic Panel    Collection Time: 12/06/24  4:48 AM   Result Value Ref Range    
0.0 - 0.2 K/UL    Immature Granulocytes Absolute 0.0 0.0 - 0.5 K/UL   Basic Metabolic Panel    Collection Time: 12/06/24  4:48 AM   Result Value Ref Range    Sodium 140 136 - 145 mmol/L    Potassium 4.4 3.5 - 5.1 mmol/L    Chloride 105 98 - 107 mmol/L    CO2 25 20 - 29 mmol/L    Anion Gap 10 7 - 16 mmol/L    Glucose 128 (H) 70 - 99 mg/dL    BUN 16 6 - 23 MG/DL    Creatinine 0.95 0.60 - 1.10 MG/DL    Est, Glom Filt Rate 80 >60 ml/min/1.73m2    Calcium 9.1 8.8 - 10.2 MG/DL   TSH with Reflex    Collection Time: 12/07/24  3:47 AM   Result Value Ref Range    TSH w Free Thyroid if Abnormal 4.61 (H) 0.27 - 4.20 UIU/ML   T4, Free    Collection Time: 12/07/24  3:47 AM   Result Value Ref Range    T4 Free 0.9 0.9 - 1.7 NG/DL       No results for input(s): \"COVID19\" in the last 72 hours.    Current Meds:  Current Facility-Administered Medications   Medication Dose Route Frequency    midodrine (PROAMATINE) tablet 5 mg  5 mg Oral TID    fludrocortisone (FLORINEF) tablet 0.1 mg  0.1 mg Oral Daily    traZODone (DESYREL) tablet 25 mg  25 mg Oral Nightly    clonazePAM (KLONOPIN) tablet 0.5 mg  0.5 mg Oral TID PRN    Atogepant TABS 60 mg (Patient Supplied)  60 mg Oral PRN    lamoTRIgine (LAMICTAL) tablet 25 mg  25 mg Oral Daily    oxyCODONE-acetaminophen (PERCOCET) 5-325 MG per tablet 1 tablet  1 tablet Oral BID    sodium chloride 0.9 % bolus 250 mL  250 mL IntraVENous Once    SUMAtriptan (IMITREX) tablet 100 mg  100 mg Oral PRN    hydrocortisone sodium succinate PF (SOLU-CORTEF) injection 100 mg  100 mg IntraVENous Q8H    amphetamine-dextroamphetamine (ADDERALL XR) capsule 30 mg- patient supplied controlled substance (Patient Supplied)  30 mg Oral QAM    citalopram (CELEXA) tablet 40 mg  40 mg Oral Daily    pantoprazole (PROTONIX) tablet 40 mg  40 mg Oral QAM AC    sodium chloride flush 0.9 % injection 5-40 mL  5-40 mL IntraVENous 2 times per day    sodium chloride flush 0.9 % injection 5-40 mL  5-40 mL IntraVENous PRN    0.9 % 
succinate PF (SOLU-CORTEF) injection 100 mg  100 mg IntraVENous Q8H    amphetamine-dextroamphetamine (ADDERALL XR) capsule 30 mg- patient supplied controlled substance (Patient Supplied)  30 mg Oral QAM    citalopram (CELEXA) tablet 40 mg  40 mg Oral Daily    pantoprazole (PROTONIX) tablet 40 mg  40 mg Oral QAM AC    sodium chloride flush 0.9 % injection 5-40 mL  5-40 mL IntraVENous 2 times per day    sodium chloride flush 0.9 % injection 5-40 mL  5-40 mL IntraVENous PRN    0.9 % sodium chloride infusion   IntraVENous PRN    potassium chloride (KLOR-CON M) extended release tablet 40 mEq  40 mEq Oral PRN    Or    potassium bicarb-citric acid (EFFER-K) effervescent tablet 40 mEq  40 mEq Oral PRN    Or    potassium chloride 10 mEq/100 mL IVPB (Peripheral Line)  10 mEq IntraVENous PRN    magnesium sulfate 2000 mg in 50 mL IVPB premix  2,000 mg IntraVENous PRN    enoxaparin (LOVENOX) injection 40 mg  40 mg SubCUTAneous Daily    ondansetron (ZOFRAN-ODT) disintegrating tablet 4 mg  4 mg Oral Q8H PRN    Or    ondansetron (ZOFRAN) injection 4 mg  4 mg IntraVENous Q6H PRN    polyethylene glycol (GLYCOLAX) packet 17 g  17 g Oral Daily PRN    acetaminophen (TYLENOL) tablet 650 mg  650 mg Oral Q6H PRN    Or    acetaminophen (TYLENOL) suppository 650 mg  650 mg Rectal Q6H PRN    Levothyroxine Sodium (SYNTHROID) 100 MCG/5ML injection 100 mcg  100 mcg IntraVENous Daily       Signed:  RODERICK WELCH    Part of this note may have been written by using a voice dictation software.  The note has been proof read but may still contain some grammatical/other typographical errors.

## 2024-12-09 NOTE — PLAN OF CARE
Problem: Discharge Planning  Goal: Discharge to home or other facility with appropriate resources  12/9/2024 0104 by Leesa Padron RN  Outcome: Progressing  Flowsheets (Taken 12/8/2024 2000)  Discharge to home or other facility with appropriate resources: Identify barriers to discharge with patient and caregiver  12/8/2024 1112 by El Jarquin, RN  Outcome: Progressing     Problem: Pain  Goal: Verbalizes/displays adequate comfort level or baseline comfort level  12/9/2024 0104 by Leesa Padron, RN  Outcome: Progressing  Flowsheets (Taken 12/8/2024 2000)  Verbalizes/displays adequate comfort level or baseline comfort level: Encourage patient to monitor pain and request assistance  12/8/2024 1112 by El Jarquin, RN  Outcome: Progressing     Problem: Safety - Adult  Goal: Free from fall injury  12/9/2024 0104 by Leesa Padron RN  Outcome: Progressing  Flowsheets (Taken 12/8/2024 2000)  Free From Fall Injury: Instruct family/caregiver on patient safety  12/8/2024 1112 by El Jarquin, RN  Outcome: Progressing

## 2024-12-09 NOTE — TELEPHONE ENCOUNTER
Up with Dr. Loja emergency room doctor who reports that the patient was likely misdiagnosed with pots disease and he is helping to manage issues with blood pressure and bradycardia.  It seems her thyroid levels improved with IV levothyroxine.  The plan of care was to transition the patient to Tirosint since there is a concern for absorption issue.  However Dr. Loja says that Tirosint was denied and so the alternative was to start levothyroxine 200 mcg daily and he reports that this prescription was also denied. We will look into this.    Follow-up plan will be to get next set of thyroid labs on 12/30/2024 and have the patient come in for follow-up visit on 1/2/2025 at 1 PM.

## 2024-12-09 NOTE — DISCHARGE SUMMARY
12/08/24 2255 -- 51 -- (!) 94/48 --   12/08/24 2249 97.7 °F (36.5 °C) (!) 49 16 -- 97 %   12/08/24 2119 -- (!) 44 -- 109/76 --   12/08/24 2000 -- (!) 42 -- -- --   12/08/24 1955 98.1 °F (36.7 °C) (!) 43 21 102/66 99 %   12/08/24 1619 98.4 °F (36.9 °C) (!) 43 15 (!) 144/97 97 %       Oxygen Therapy  SpO2: 97 %  Pulse via Oximetry: (!) 34 beats per minute  O2 Device: None (Room air)    Estimated body mass index is 21.61 kg/m² as calculated from the following:    Height as of this encounter: 1.6 m (5' 3\").    Weight as of this encounter: 55.3 kg (122 lb).    Intake/Output Summary (Last 24 hours) at 12/9/2024 1515  Last data filed at 12/9/2024 1005  Gross per 24 hour   Intake 1220 ml   Output --   Net 1220 ml         Physical Exam:    General:    Well nourished.  No overt distress  Head:  Normocephalic, atraumatic  Eyes:  Sclerae appear normal.  Pupils equally round.    HENT:  Nares appear normal, no drainage.  Moist mucous membranes  Neck:  No restricted ROM.  Trachea midline  CV:   RRR.  No m/r/g.  No JVD  Lungs:   CTAB.  No wheezing, rhonchi, or rales.  Respirations even, unlabored  Abdomen:   Soft, nontender, nondistended.    Extremities: Warm and dry.   No edema.    Skin:     No rashes.  Normal coloration  Neuro:  CN II-XII grossly intact.  Psych:  Normal mood and affect.      Signed:  Migel Loja MD    Part of this note may have been written by using a voice dictation software.  The note has been proof read but may still contain some grammatical/other typographical errors.

## 2024-12-09 NOTE — CARE COORDINATION
Discharge order is in. Pt is discharging home today in stable condition. Pt does not qualify for home oxygen per RT:    12/09/24 1200    Resting (Room Air)   SpO2 97   HR 53   During Walk (Room Air)   SpO2 95   HR 59   Walk/Assistance Device Ambulation   Rate of Dyspnea 0   Symptoms Fatigue   After Walk   SpO2 97   HR 52   O2 Flow Rate (l/min) 0 l/min   Rate of Dyspnea 0   Symptoms Fatigue   Does the Patient Qualify for Home O2 No   Does the Patient Need Portable Oxygen Tanks No   Patient ambulated from room to end of hallway and back with no complications noted.              PT/OT recommended no needs post discharge. No other discharge needs identified by CM. Tx goals met.       12/09/24 1517   Services At/After Discharge   Transition of Care Consult (CM Consult) Discharge Planning   Services At/After Discharge None   [place] Resource Information Provided? No   Mode of Transport at Discharge Other (see comment)  (Family)   Confirm Follow Up Transport Family   Condition of Participation: Discharge Planning   The Patient and/or Patient Representative was provided with a Choice of Provider? Patient   The Patient and/Or Patient Representative agree with the Discharge Plan? Yes   Freedom of Choice list was provided with basic dialogue that supports the patient's individualized plan of care/goals, treatment preferences, and shares the quality data associated with the providers?  Yes         
Assistance Needed N/A   DME Ordered? No   Potential Assistance Purchasing Medications No   Type of Home Care Services None   Patient expects to be discharged to: House   Services At/After Discharge   Transition of Care Consult (CM Consult) Discharge Planning    Resource Information Provided? No   Mode of Transport at Discharge Other (see comment)  (Family)   Confirm Follow Up Transport Self

## 2024-12-09 NOTE — PROGRESS NOTES
[facility] CARDIOLOGY History & Physical                 Reason for Visit: Posthospitalization follow-up    Subjective:     Patient is a 34 y.o. female with a PMH of PFO, hypothyroidism and Crohn's disease who presents as a posthospitalization follow-up.  The patient was hospitalized and discharged .  Endocrinology was consulted for \"severe hypothyroidism\".  The patient was initiated on treatment.  She was noted to have marked sinus bradycardia in the setting of hypothyroidism.  The patient underwent a ALIREZA in 2024 that noted a PFO with left-to-right shunting.  She had a TTE at OSH in 2024 that was noted to demonstrate a normal EF.  The patient reports \"I have a lot of chest pressure\" as well as pain that radiates into the right shoulder.  She also reports RAYMUNDO.  The patient does not report any alleviating or aggravating factors for the chest pain when it occurs.    Past Medical History:   Diagnosis Date    Anxiety     Crohn disease (HCC)     Depression     Headache       Past Surgical History:   Procedure Laterality Date     SECTION      CHOLECYSTECTOMY      TONSILLECTOMY        No family history on file.   Social History     Tobacco Use    Smoking status: Never    Smokeless tobacco: Never   Substance Use Topics    Alcohol use: Not Currently      Allergies   Allergen Reactions    Ciprofloxacin Hives    Aloe Vera Rash     \"jackson skin\"  \"blisters    Clindamycin Rash    Prednisone Other (See Comments)     Makes her cry a lot           ROS:  No obvious pertinent positives on review of systems except for what was outlined above.       Objective:       /70 (Site: Left Upper Arm, Position: Sitting)   Pulse 61   Ht 1.6 m (5' 3\")   Wt 60.3 kg (133 lb)   LMP 2024   BMI 23.56 kg/m²     BP Readings from Last 3 Encounters:   12/10/24 128/70   24 109/88   24 122/72       Wt Readings from Last 3 Encounters:   12/10/24 60.3 kg (133 lb)   24 55.3 kg (122 lb)   24 55.2

## 2024-12-10 ENCOUNTER — OFFICE VISIT (OUTPATIENT)
Age: 34
End: 2024-12-10
Payer: MEDICAID

## 2024-12-10 ENCOUNTER — PATIENT MESSAGE (OUTPATIENT)
Dept: ENDOCRINOLOGY | Age: 34
End: 2024-12-10

## 2024-12-10 VITALS
DIASTOLIC BLOOD PRESSURE: 70 MMHG | HEIGHT: 63 IN | SYSTOLIC BLOOD PRESSURE: 128 MMHG | BODY MASS INDEX: 23.57 KG/M2 | HEART RATE: 61 BPM | WEIGHT: 133 LBS

## 2024-12-10 DIAGNOSIS — R07.89 ATYPICAL CHEST PAIN: ICD-10-CM

## 2024-12-10 DIAGNOSIS — E03.9 HYPOTHYROIDISM, UNSPECIFIED TYPE: ICD-10-CM

## 2024-12-10 DIAGNOSIS — R00.1 BRADYCARDIA: ICD-10-CM

## 2024-12-10 DIAGNOSIS — Q21.12 PFO (PATENT FORAMEN OVALE): Primary | ICD-10-CM

## 2024-12-10 DIAGNOSIS — R06.09 DOE (DYSPNEA ON EXERTION): ICD-10-CM

## 2024-12-10 PROCEDURE — 99214 OFFICE O/P EST MOD 30 MIN: CPT | Performed by: INTERNAL MEDICINE

## 2024-12-10 PROCEDURE — 93000 ELECTROCARDIOGRAM COMPLETE: CPT | Performed by: INTERNAL MEDICINE

## 2024-12-10 NOTE — TELEPHONE ENCOUNTER
Pt has appointment schedule for 1/2/25 @1 pm, per Dr. Ocampo.    all other ROS negative except as per HPI

## 2024-12-10 NOTE — TELEPHONE ENCOUNTER
Called Medicaid and spoke to Marianne, she states that Tirosint didn't sign a contract with Medicaid/Medicare this year. She also told me that it looks like the Levothyroxine and Tirosint was sent through as medical and that was why it was denied. But proceed to say that Tirosent wasn't formulary, but we can do a PA explaining why she needs Tirosent. The more I talked to her she stated that [facility] was the managing care for the patient. Called [facility], spoke to Michaela, she states that Tirosent is not covered, nor is any injection or liquid capsule. The only thing that is covered is the tablets. While talking to her, explaining the concerns of absorption of the medication and why liquid is need. She said we need to do a PA for it with chart notes, reason and documentation supporting the reason Pt needs th liquid for of her thyroid hormone and not the tablet form.     Dr. Ocampo, can you please write a letter explaining why this medically necessary and with documentation, as to why she needs liquid and not the tablet? I will fill out the paper form with patients office visits.

## 2024-12-11 NOTE — TELEPHONE ENCOUNTER
Received PA from [employer] and Levothyroxine sodium 100 mcg CAPSULE is APPROVED good through 12/11/2025. Spoke to Dani with [facility], they ran the claim and it went through. They have to order it and it will be in tomorrow. I called the Pt's let her know that [facility] will get it in tomorrow and she can go pick it up. She expressed understanding.

## 2024-12-14 ENCOUNTER — HOSPITAL ENCOUNTER (EMERGENCY)
Age: 34
Discharge: HOME | End: 2024-12-14
Attending: EMERGENCY MEDICINE
Payer: MEDICAID

## 2024-12-14 ENCOUNTER — APPOINTMENT (OUTPATIENT)
Dept: GENERAL RADIOLOGY | Age: 34
End: 2024-12-14
Payer: MEDICAID

## 2024-12-14 ENCOUNTER — APPOINTMENT (OUTPATIENT)
Dept: CT IMAGING | Age: 34
End: 2024-12-14
Payer: MEDICAID

## 2024-12-14 VITALS
TEMPERATURE: 98.7 F | SYSTOLIC BLOOD PRESSURE: 137 MMHG | DIASTOLIC BLOOD PRESSURE: 91 MMHG | RESPIRATION RATE: 18 BRPM | HEART RATE: 45 BPM | OXYGEN SATURATION: 97 %

## 2024-12-14 DIAGNOSIS — F41.1 ANXIETY STATE: ICD-10-CM

## 2024-12-14 DIAGNOSIS — R07.89 ATYPICAL CHEST PAIN: Primary | ICD-10-CM

## 2024-12-14 DIAGNOSIS — G43.809 OTHER MIGRAINE WITHOUT STATUS MIGRAINOSUS, NOT INTRACTABLE: ICD-10-CM

## 2024-12-14 PROBLEM — F11.11 HISTORY OF OPIOID ABUSE (HCC): Status: ACTIVE | Noted: 2022-08-18

## 2024-12-14 PROBLEM — G43.009 MIGRAINE WITHOUT AURA OR STATUS MIGRAINOSUS: Status: ACTIVE | Noted: 2023-07-26

## 2024-12-14 PROBLEM — F31.9 BIPOLAR DISORDER (HCC): Status: ACTIVE | Noted: 2022-04-26

## 2024-12-14 PROBLEM — M79.7 FIBROMYALGIA: Status: ACTIVE | Noted: 2024-07-17

## 2024-12-14 PROBLEM — R00.1 SYMPTOMATIC SINUS BRADYCARDIA: Status: ACTIVE | Noted: 2024-06-05

## 2024-12-14 LAB
ALBUMIN SERPL-MCNC: 3.2 G/DL (ref 3.5–5)
ALBUMIN/GLOB SERPL: 1 (ref 1–1.9)
ALP SERPL-CCNC: 76 U/L (ref 35–104)
ALT SERPL-CCNC: 125 U/L (ref 8–45)
ANION GAP SERPL CALC-SCNC: 8 MMOL/L (ref 7–16)
AST SERPL-CCNC: 86 U/L (ref 15–37)
BASOPHILS # BLD: 0.1 K/UL (ref 0–0.2)
BASOPHILS NFR BLD: 1 % (ref 0–2)
BILIRUB SERPL-MCNC: 0.2 MG/DL (ref 0–1.2)
BUN SERPL-MCNC: 13 MG/DL (ref 6–23)
CALCIUM SERPL-MCNC: 8.7 MG/DL (ref 8.8–10.2)
CHLORIDE SERPL-SCNC: 105 MMOL/L (ref 98–107)
CO2 SERPL-SCNC: 28 MMOL/L (ref 20–29)
CREAT SERPL-MCNC: 0.94 MG/DL (ref 0.6–1.1)
DIFFERENTIAL METHOD BLD: ABNORMAL
EOSINOPHIL # BLD: 0.3 K/UL (ref 0–0.8)
EOSINOPHIL NFR BLD: 3 % (ref 0.5–7.8)
ERYTHROCYTE [DISTWIDTH] IN BLOOD BY AUTOMATED COUNT: 12.9 % (ref 11.9–14.6)
GLOBULIN SER CALC-MCNC: 3.2 G/DL (ref 2.3–3.5)
GLUCOSE SERPL-MCNC: 84 MG/DL (ref 70–99)
HCT VFR BLD AUTO: 35.7 % (ref 35.8–46.3)
HGB BLD-MCNC: 11.4 G/DL (ref 11.7–15.4)
IMM GRANULOCYTES # BLD AUTO: 0 K/UL (ref 0–0.5)
IMM GRANULOCYTES NFR BLD AUTO: 0 % (ref 0–5)
LYMPHOCYTES # BLD: 2.6 K/UL (ref 0.5–4.6)
LYMPHOCYTES NFR BLD: 28 % (ref 13–44)
MAGNESIUM SERPL-MCNC: 2.4 MG/DL (ref 1.8–2.4)
MCH RBC QN AUTO: 30.7 PG (ref 26.1–32.9)
MCHC RBC AUTO-ENTMCNC: 31.9 G/DL (ref 31.4–35)
MCV RBC AUTO: 96.2 FL (ref 82–102)
MONOCYTES # BLD: 0.9 K/UL (ref 0.1–1.3)
MONOCYTES NFR BLD: 9 % (ref 4–12)
NEUTS SEG # BLD: 5.5 K/UL (ref 1.7–8.2)
NEUTS SEG NFR BLD: 59 % (ref 43–78)
NRBC # BLD: 0 K/UL (ref 0–0.2)
NT PRO BNP: 711 PG/ML (ref 0–125)
PHOSPHATE SERPL-MCNC: 3.6 MG/DL (ref 2.5–4.5)
PLATELET # BLD AUTO: 300 K/UL (ref 150–450)
PMV BLD AUTO: 10.7 FL (ref 9.4–12.3)
POTASSIUM SERPL-SCNC: 4.6 MMOL/L (ref 3.5–5.1)
PROT SERPL-MCNC: 6.4 G/DL (ref 6.3–8.2)
RBC # BLD AUTO: 3.71 M/UL (ref 4.05–5.2)
SODIUM SERPL-SCNC: 141 MMOL/L (ref 136–145)
T4 FREE SERPL-MCNC: 1.2 NG/DL (ref 0.9–1.7)
TROPONIN T SERPL HS-MCNC: <6 NG/L (ref 0–14)
TROPONIN T SERPL HS-MCNC: <6 NG/L (ref 0–14)
TSH W FREE THYROID IF ABNORMAL: 10.7 UIU/ML (ref 0.27–4.2)
WBC # BLD AUTO: 9.2 K/UL (ref 4.3–11.1)

## 2024-12-14 PROCEDURE — 71045 X-RAY EXAM CHEST 1 VIEW: CPT

## 2024-12-14 PROCEDURE — 96365 THER/PROPH/DIAG IV INF INIT: CPT

## 2024-12-14 PROCEDURE — 99285 EMERGENCY DEPT VISIT HI MDM: CPT

## 2024-12-14 PROCEDURE — 70450 CT HEAD/BRAIN W/O DYE: CPT

## 2024-12-14 PROCEDURE — 84484 ASSAY OF TROPONIN QUANT: CPT

## 2024-12-14 PROCEDURE — 83735 ASSAY OF MAGNESIUM: CPT

## 2024-12-14 PROCEDURE — 96375 TX/PRO/DX INJ NEW DRUG ADDON: CPT

## 2024-12-14 PROCEDURE — 84100 ASSAY OF PHOSPHORUS: CPT

## 2024-12-14 PROCEDURE — 80053 COMPREHEN METABOLIC PANEL: CPT

## 2024-12-14 PROCEDURE — 2500000003 HC RX 250 WO HCPCS: Performed by: EMERGENCY MEDICINE

## 2024-12-14 PROCEDURE — 93005 ELECTROCARDIOGRAM TRACING: CPT | Performed by: EMERGENCY MEDICINE

## 2024-12-14 PROCEDURE — 85025 COMPLETE CBC W/AUTO DIFF WBC: CPT

## 2024-12-14 PROCEDURE — 83880 ASSAY OF NATRIURETIC PEPTIDE: CPT

## 2024-12-14 PROCEDURE — 84443 ASSAY THYROID STIM HORMONE: CPT

## 2024-12-14 PROCEDURE — 84439 ASSAY OF FREE THYROXINE: CPT

## 2024-12-14 PROCEDURE — 2580000003 HC RX 258: Performed by: EMERGENCY MEDICINE

## 2024-12-14 PROCEDURE — 6360000002 HC RX W HCPCS: Performed by: EMERGENCY MEDICINE

## 2024-12-14 RX ORDER — MAGNESIUM SULFATE IN WATER 40 MG/ML
2000 INJECTION, SOLUTION INTRAVENOUS ONCE
Status: COMPLETED | OUTPATIENT
Start: 2024-12-14 | End: 2024-12-14

## 2024-12-14 RX ORDER — ONDANSETRON 2 MG/ML
4 INJECTION INTRAMUSCULAR; INTRAVENOUS
Status: COMPLETED | OUTPATIENT
Start: 2024-12-14 | End: 2024-12-14

## 2024-12-14 RX ORDER — DEXAMETHASONE SODIUM PHOSPHATE 10 MG/ML
10 INJECTION INTRAMUSCULAR; INTRAVENOUS
Status: COMPLETED | OUTPATIENT
Start: 2024-12-14 | End: 2024-12-14

## 2024-12-14 RX ORDER — 0.9 % SODIUM CHLORIDE 0.9 %
1000 INTRAVENOUS SOLUTION INTRAVENOUS
Status: COMPLETED | OUTPATIENT
Start: 2024-12-14 | End: 2024-12-14

## 2024-12-14 RX ORDER — PROCHLORPERAZINE EDISYLATE 5 MG/ML
10 INJECTION INTRAMUSCULAR; INTRAVENOUS
Status: COMPLETED | OUTPATIENT
Start: 2024-12-14 | End: 2024-12-14

## 2024-12-14 RX ORDER — KETOROLAC TROMETHAMINE 30 MG/ML
30 INJECTION, SOLUTION INTRAMUSCULAR; INTRAVENOUS ONCE
Status: COMPLETED | OUTPATIENT
Start: 2024-12-14 | End: 2024-12-14

## 2024-12-14 RX ORDER — HYDROMORPHONE HYDROCHLORIDE 1 MG/ML
1 INJECTION, SOLUTION INTRAMUSCULAR; INTRAVENOUS; SUBCUTANEOUS
Status: COMPLETED | OUTPATIENT
Start: 2024-12-14 | End: 2024-12-14

## 2024-12-14 RX ADMIN — SODIUM CHLORIDE 1 MG: 9 INJECTION INTRAMUSCULAR; INTRAVENOUS; SUBCUTANEOUS at 20:59

## 2024-12-14 RX ADMIN — MAGNESIUM SULFATE HEPTAHYDRATE 2000 MG: 40 INJECTION, SOLUTION INTRAVENOUS at 21:01

## 2024-12-14 RX ADMIN — DEXAMETHASONE SODIUM PHOSPHATE 10 MG: 10 INJECTION INTRAMUSCULAR; INTRAVENOUS at 21:00

## 2024-12-14 RX ADMIN — PROCHLORPERAZINE EDISYLATE 10 MG: 5 INJECTION INTRAMUSCULAR; INTRAVENOUS at 21:00

## 2024-12-14 RX ADMIN — SODIUM CHLORIDE 1000 ML: 9 INJECTION, SOLUTION INTRAVENOUS at 19:01

## 2024-12-14 RX ADMIN — ONDANSETRON 4 MG: 2 INJECTION, SOLUTION INTRAMUSCULAR; INTRAVENOUS at 19:01

## 2024-12-14 RX ADMIN — KETOROLAC TROMETHAMINE 30 MG: 30 INJECTION, SOLUTION INTRAMUSCULAR at 21:00

## 2024-12-14 RX ADMIN — HYDROMORPHONE HYDROCHLORIDE 1 MG: 1 INJECTION, SOLUTION INTRAMUSCULAR; INTRAVENOUS; SUBCUTANEOUS at 19:01

## 2024-12-14 RX ADMIN — PANTOPRAZOLE SODIUM 40 MG: 40 INJECTION, POWDER, FOR SOLUTION INTRAVENOUS at 19:03

## 2024-12-14 ASSESSMENT — ENCOUNTER SYMPTOMS
SHORTNESS OF BREATH: 0
VOMITING: 1
ABDOMINAL PAIN: 0

## 2024-12-14 NOTE — ED PROVIDER NOTES
Emergency Department Provider Note       PCP: Rafi Velasquez MD   Age: 34 y.o.   Sex: female     DISPOSITION Decision To Discharge 12/14/2024 09:37:36 PM    ICD-10-CM    1. Atypical chest pain  R07.89       2. Other migraine without status migrainosus, not intractable  G43.809       3. Anxiety state  F41.1           Medical Decision Making     34-year-old female patient presents to the ER with concerns over headache left-sided chest neck and arm pain nausea vomiting  Recently admitted with an episode of severe hypothyroidism  Workup today revealed a normal EKG, only persistently bradycardic  Troponin x 2 negative  Patient's T4 level has increased up to 1.2 in the normal range  CT imaging of the head negative  Chest x-ray clear  Patient feeling much better after migraine cocktail  Will discharge with close follow-up with her primary, endocrine and cardiologist     1 or more acute illnesses that pose a threat to life or bodily function.   Parental controlled substances given in the ED.  Patient was discharged risks and benefits of hospitalization were considered.  Shared medical decision making was utilized in creating the patients health plan today.  I independently ordered and reviewed each unique test.  Pati12/05/202412/05/20241  Oxycodone-Acetaminophen 5-325  60.0030Ja [id number] [phone] MMEMedicaidSC11/20/202411/20/20241  Dextroamp-Amphet Er 30 Mg Cap  30.0030Ja [id number] ([id number]/20/202411/20/20241  Clonazepam 0.5 Mg Tablet  90.0030Ja [id number] [phone] LMEMedicaidSC11/07/202411/04/20241  Oxycodone-Acetaminophen 5-325  60.0030Ja [id number] [phone] MMEMedicaidSC10/18/202410/17/20241  Clonazepam 0.5 Mg Tablet  90.0030Ja [id number] [phone] LMEMedicaidSC10/18/202410/17/20241  Dextroamp-Amphet Er 30 Mg Cap  30.0030Ja [id number] ([id number]/08/202410/02/20241  Oxycodone-Acetaminophen 5-325  60.0030Ja [id number] [phone]  acute ischemic changes  Rate has increased slightly when compared with prior tracings    EKG rhythm strip interpretation  Rate 40  Rhythm sinus  No ectopy      Orders placed during this emergency department visit:     Orders Placed This Encounter   Procedures    XR CHEST 1 VIEW    CT HEAD WO CONTRAST    Brain Natriuretic Peptide    CBC with Auto Differential    Comprehensive Metabolic Panel    Magnesium    Phosphorus    Troponin    TSH with Reflex    T4, Free    Cardiac Monitor - ED Only    EKG 12 Lead        Medications given during this emergency department visit:     Medications   magnesium sulfate 2000 mg in 50 mL IVPB premix (2,000 mg IntraVENous New Bag 24)   sodium chloride 0.9 % bolus 1,000 mL (0 mLs IntraVENous Stopped 24)   pantoprazole (PROTONIX) 40 mg in sodium chloride (PF) 0.9 % 10 mL injection (40 mg IntraVENous Given 24)   ondansetron (ZOFRAN) injection 4 mg (4 mg IntraVENous Given 24)   HYDROmorphone HCl PF (DILAUDID) injection 1 mg (1 mg IntraVENous Given 24)   dexAMETHasone (DECADRON) injection 10 mg (10 mg IntraVENous Given 24)   ketorolac (TORADOL) injection 30 mg (30 mg IntraVENous Given 24)   prochlorperazine (COMPAZINE) injection 10 mg (10 mg IntraVENous Given 24)   LORazepam (ATIVAN) 1 mg in sodium chloride (PF) 0.9 % 10 mL injection (1 mg IntraVENous Given 24)       New prescriptions:     New Prescriptions    No medications on file        Past History and Complexity:     Past Medical History:   Diagnosis Date    Anxiety     Crohn disease (HCC)     Depression     Headache         Past Surgical History:   Procedure Laterality Date     SECTION      CHOLECYSTECTOMY      TONSILLECTOMY          Social History     Socioeconomic History    Marital status: Single     Spouse name: None    Number of children: None    Years of education: None    Highest education level: None   Tobacco Use

## 2024-12-14 NOTE — ED TRIAGE NOTES
Pt Pt c/o L sided chest pain that astarted around 1400, pt also c/o headache, n/v with dark red blood, pt states she was recently admitted for having low thyroid. Pt states she has hx of chron's and pots.

## 2024-12-15 LAB
EKG ATRIAL RATE: 40 BPM
EKG DIAGNOSIS: NORMAL
EKG P AXIS: 58 DEGREES
EKG P-R INTERVAL: 125 MS
EKG Q-T INTERVAL: 544 MS
EKG QRS DURATION: 102 MS
EKG QTC CALCULATION (BAZETT): 444 MS
EKG R AXIS: 13 DEGREES
EKG T AXIS: 42 DEGREES
EKG VENTRICULAR RATE: 40 BPM

## 2024-12-15 NOTE — DISCHARGE INSTRUCTIONS
Continue all your current medications  Drink plenty of fluids  Get plenty of rest    Call your endocrinologist on Monday to discuss follow-up and any medication changes  Call your cardiologist to discuss follow-up any other further workup they may feel is indicated    Call your doctor or the follow up doctor to set up appointment for recheck visit    Return to ER for any worsening symptoms or new problems which may arise

## 2024-12-19 ENCOUNTER — APPOINTMENT (OUTPATIENT)
Dept: GENERAL RADIOLOGY | Age: 34
End: 2024-12-19
Payer: MEDICAID

## 2024-12-19 ENCOUNTER — HOSPITAL ENCOUNTER (OUTPATIENT)
Age: 34
Setting detail: OBSERVATION
Discharge: HOME | End: 2024-12-21
Attending: EMERGENCY MEDICINE | Admitting: INTERNAL MEDICINE
Payer: MEDICAID

## 2024-12-19 DIAGNOSIS — R00.1 SYMPTOMATIC BRADYCARDIA: Primary | ICD-10-CM

## 2024-12-19 DIAGNOSIS — G43.019 INTRACTABLE MIGRAINE WITHOUT AURA AND WITHOUT STATUS MIGRAINOSUS: Chronic | ICD-10-CM

## 2024-12-19 PROBLEM — F31.9 BIPOLAR DISORDER (HCC): Chronic | Status: ACTIVE | Noted: 2022-04-26

## 2024-12-19 PROBLEM — M79.7 FIBROMYALGIA: Chronic | Status: ACTIVE | Noted: 2024-07-17

## 2024-12-19 PROBLEM — R06.09 DOE (DYSPNEA ON EXERTION): Status: RESOLVED | Noted: 2024-12-10 | Resolved: 2024-12-19

## 2024-12-19 PROBLEM — K50.90 CROHN DISEASE (HCC): Chronic | Status: ACTIVE | Noted: 2024-12-05

## 2024-12-19 PROBLEM — G90.A POTS (POSTURAL ORTHOSTATIC TACHYCARDIA SYNDROME): Chronic | Status: ACTIVE | Noted: 2024-12-05

## 2024-12-19 PROBLEM — E03.9 ACQUIRED HYPOTHYROIDISM: Chronic | Status: ACTIVE | Noted: 2024-09-12

## 2024-12-19 PROBLEM — K21.9 GERD (GASTROESOPHAGEAL REFLUX DISEASE): Chronic | Status: ACTIVE | Noted: 2024-12-05

## 2024-12-19 PROBLEM — Q21.10 ASD (ATRIAL SEPTAL DEFECT): Chronic | Status: ACTIVE | Noted: 2024-12-05

## 2024-12-19 PROBLEM — F41.1 GENERALIZED ANXIETY DISORDER: Chronic | Status: ACTIVE | Noted: 2020-08-04

## 2024-12-19 PROBLEM — F11.11 HISTORY OF OPIOID ABUSE (HCC): Chronic | Status: ACTIVE | Noted: 2022-08-18

## 2024-12-19 PROBLEM — R07.89 ATYPICAL CHEST PAIN: Status: RESOLVED | Noted: 2024-12-10 | Resolved: 2024-12-19

## 2024-12-19 LAB
ALBUMIN SERPL-MCNC: 3.8 G/DL (ref 3.5–5)
ALBUMIN/GLOB SERPL: 0.9 (ref 1–1.9)
ALP SERPL-CCNC: 85 U/L (ref 35–104)
ALT SERPL-CCNC: 86 U/L (ref 8–45)
ANION GAP SERPL CALC-SCNC: 10 MMOL/L (ref 7–16)
AST SERPL-CCNC: 44 U/L (ref 15–37)
BASOPHILS # BLD: 0 K/UL (ref 0–0.2)
BASOPHILS NFR BLD: 0 % (ref 0–2)
BILIRUB SERPL-MCNC: 0.4 MG/DL (ref 0–1.2)
BUN SERPL-MCNC: 22 MG/DL (ref 6–23)
CALCIUM SERPL-MCNC: 9.4 MG/DL (ref 8.8–10.2)
CHLORIDE SERPL-SCNC: 104 MMOL/L (ref 98–107)
CHP ED QC CHECK: 109
CO2 SERPL-SCNC: 26 MMOL/L (ref 20–29)
CREAT SERPL-MCNC: 0.96 MG/DL (ref 0.6–1.1)
DIFFERENTIAL METHOD BLD: NORMAL
EKG ATRIAL RATE: 40 BPM
EKG ATRIAL RATE: 68 BPM
EKG DIAGNOSIS: NORMAL
EKG DIAGNOSIS: NORMAL
EKG P AXIS: 77 DEGREES
EKG P AXIS: 92 DEGREES
EKG P-R INTERVAL: 122 MS
EKG P-R INTERVAL: 130 MS
EKG Q-T INTERVAL: 418 MS
EKG Q-T INTERVAL: 510 MS
EKG QRS DURATION: 82 MS
EKG QRS DURATION: 86 MS
EKG QTC CALCULATION (BAZETT): 415 MS
EKG QTC CALCULATION (BAZETT): 444 MS
EKG R AXIS: 22 DEGREES
EKG R AXIS: 34 DEGREES
EKG T AXIS: 49 DEGREES
EKG T AXIS: 58 DEGREES
EKG VENTRICULAR RATE: 40 BPM
EKG VENTRICULAR RATE: 68 BPM
EOSINOPHIL # BLD: 0.1 K/UL (ref 0–0.8)
EOSINOPHIL NFR BLD: 1 % (ref 0.5–7.8)
ERYTHROCYTE [DISTWIDTH] IN BLOOD BY AUTOMATED COUNT: 13.1 % (ref 11.9–14.6)
GLOBULIN SER CALC-MCNC: 4 G/DL (ref 2.3–3.5)
GLUCOSE BLD STRIP.AUTO-MCNC: 109 MG/DL (ref 65–100)
GLUCOSE SERPL-MCNC: 62 MG/DL (ref 70–99)
HCT VFR BLD AUTO: 40.5 % (ref 35.8–46.3)
HGB BLD-MCNC: 12.9 G/DL (ref 11.7–15.4)
IMM GRANULOCYTES # BLD AUTO: 0 K/UL (ref 0–0.5)
IMM GRANULOCYTES NFR BLD AUTO: 1 % (ref 0–5)
LYMPHOCYTES # BLD: 1.2 K/UL (ref 0.5–4.6)
LYMPHOCYTES NFR BLD: 16 % (ref 13–44)
MCH RBC QN AUTO: 30.3 PG (ref 26.1–32.9)
MCHC RBC AUTO-ENTMCNC: 31.9 G/DL (ref 31.4–35)
MCV RBC AUTO: 95.1 FL (ref 82–102)
MONOCYTES # BLD: 0.6 K/UL (ref 0.1–1.3)
MONOCYTES NFR BLD: 8 % (ref 4–12)
NEUTS SEG # BLD: 5.8 K/UL (ref 1.7–8.2)
NEUTS SEG NFR BLD: 74 % (ref 43–78)
NRBC # BLD: 0 K/UL (ref 0–0.2)
PLATELET # BLD AUTO: 338 K/UL (ref 150–450)
PMV BLD AUTO: 10 FL (ref 9.4–12.3)
POTASSIUM SERPL-SCNC: 4.1 MMOL/L (ref 3.5–5.1)
PROT SERPL-MCNC: 7.7 G/DL (ref 6.3–8.2)
RBC # BLD AUTO: 4.26 M/UL (ref 4.05–5.2)
SERVICE CMNT-IMP: ABNORMAL
SODIUM SERPL-SCNC: 140 MMOL/L (ref 136–145)
T4 FREE SERPL-MCNC: 1 NG/DL (ref 0.9–1.7)
TROPONIN T SERPL HS-MCNC: 6 NG/L (ref 0–14)
TROPONIN T SERPL HS-MCNC: 9 NG/L (ref 0–14)
TSH, 3RD GENERATION: 1.79 UIU/ML (ref 0.27–4.2)
WBC # BLD AUTO: 7.8 K/UL (ref 4.3–11.1)

## 2024-12-19 PROCEDURE — 84484 ASSAY OF TROPONIN QUANT: CPT

## 2024-12-19 PROCEDURE — 6360000002 HC RX W HCPCS

## 2024-12-19 PROCEDURE — 96372 THER/PROPH/DIAG INJ SC/IM: CPT

## 2024-12-19 PROCEDURE — G0378 HOSPITAL OBSERVATION PER HR: HCPCS

## 2024-12-19 PROCEDURE — 6370000000 HC RX 637 (ALT 250 FOR IP): Performed by: PHYSICIAN ASSISTANT

## 2024-12-19 PROCEDURE — 99285 EMERGENCY DEPT VISIT HI MDM: CPT

## 2024-12-19 PROCEDURE — 93005 ELECTROCARDIOGRAM TRACING: CPT | Performed by: PHYSICIAN ASSISTANT

## 2024-12-19 PROCEDURE — 93010 ELECTROCARDIOGRAM REPORT: CPT | Performed by: INTERNAL MEDICINE

## 2024-12-19 PROCEDURE — 80053 COMPREHEN METABOLIC PANEL: CPT

## 2024-12-19 PROCEDURE — 6360000002 HC RX W HCPCS: Performed by: INTERNAL MEDICINE

## 2024-12-19 PROCEDURE — 84439 ASSAY OF FREE THYROXINE: CPT

## 2024-12-19 PROCEDURE — 85025 COMPLETE CBC W/AUTO DIFF WBC: CPT

## 2024-12-19 PROCEDURE — 84443 ASSAY THYROID STIM HORMONE: CPT

## 2024-12-19 PROCEDURE — 96374 THER/PROPH/DIAG INJ IV PUSH: CPT

## 2024-12-19 PROCEDURE — 82962 GLUCOSE BLOOD TEST: CPT

## 2024-12-19 PROCEDURE — 71046 X-RAY EXAM CHEST 2 VIEWS: CPT

## 2024-12-19 PROCEDURE — 99222 1ST HOSP IP/OBS MODERATE 55: CPT | Performed by: INTERNAL MEDICINE

## 2024-12-19 PROCEDURE — 96375 TX/PRO/DX INJ NEW DRUG ADDON: CPT

## 2024-12-19 PROCEDURE — 2500000003 HC RX 250 WO HCPCS: Performed by: INTERNAL MEDICINE

## 2024-12-19 PROCEDURE — 6370000000 HC RX 637 (ALT 250 FOR IP): Performed by: INTERNAL MEDICINE

## 2024-12-19 RX ORDER — KETOROLAC TROMETHAMINE 15 MG/ML
30 INJECTION, SOLUTION INTRAMUSCULAR; INTRAVENOUS ONCE
Status: COMPLETED | OUTPATIENT
Start: 2024-12-19 | End: 2024-12-19

## 2024-12-19 RX ORDER — HYDRALAZINE HYDROCHLORIDE 20 MG/ML
20 INJECTION INTRAMUSCULAR; INTRAVENOUS EVERY 4 HOURS PRN
Status: DISCONTINUED | OUTPATIENT
Start: 2024-12-19 | End: 2024-12-20

## 2024-12-19 RX ORDER — FAMOTIDINE 20 MG/1
10 TABLET, FILM COATED ORAL DAILY PRN
Status: DISCONTINUED | OUTPATIENT
Start: 2024-12-19 | End: 2024-12-21 | Stop reason: HOSPADM

## 2024-12-19 RX ORDER — POTASSIUM CHLORIDE 7.45 MG/ML
10 INJECTION INTRAVENOUS PRN
Status: DISCONTINUED | OUTPATIENT
Start: 2024-12-19 | End: 2024-12-21 | Stop reason: HOSPADM

## 2024-12-19 RX ORDER — SODIUM CHLORIDE 9 MG/ML
INJECTION, SOLUTION INTRAVENOUS PRN
Status: DISCONTINUED | OUTPATIENT
Start: 2024-12-19 | End: 2024-12-21 | Stop reason: HOSPADM

## 2024-12-19 RX ORDER — PANTOPRAZOLE SODIUM 40 MG/1
40 TABLET, DELAYED RELEASE ORAL
Status: DISCONTINUED | OUTPATIENT
Start: 2024-12-20 | End: 2024-12-21 | Stop reason: HOSPADM

## 2024-12-19 RX ORDER — CLONAZEPAM 1 MG/1
0.5 TABLET ORAL 3 TIMES DAILY PRN
Status: DISCONTINUED | OUTPATIENT
Start: 2024-12-19 | End: 2024-12-21 | Stop reason: HOSPADM

## 2024-12-19 RX ORDER — ACETAMINOPHEN 650 MG/1
650 SUPPOSITORY RECTAL EVERY 6 HOURS PRN
Status: DISCONTINUED | OUTPATIENT
Start: 2024-12-19 | End: 2024-12-21 | Stop reason: HOSPADM

## 2024-12-19 RX ORDER — POLYETHYLENE GLYCOL 3350 17 G/17G
17 POWDER, FOR SOLUTION ORAL DAILY PRN
Status: DISCONTINUED | OUTPATIENT
Start: 2024-12-19 | End: 2024-12-21 | Stop reason: HOSPADM

## 2024-12-19 RX ORDER — ONDANSETRON 4 MG/1
4 TABLET, ORALLY DISINTEGRATING ORAL EVERY 8 HOURS PRN
Status: DISCONTINUED | OUTPATIENT
Start: 2024-12-19 | End: 2024-12-21 | Stop reason: HOSPADM

## 2024-12-19 RX ORDER — LEVOTHYROXINE SODIUM 100 UG/1
100 CAPSULE ORAL DAILY
Status: DISCONTINUED | OUTPATIENT
Start: 2024-12-20 | End: 2024-12-19

## 2024-12-19 RX ORDER — LAMOTRIGINE 25 MG/1
50 TABLET ORAL DAILY
Status: DISCONTINUED | OUTPATIENT
Start: 2024-12-19 | End: 2024-12-21 | Stop reason: HOSPADM

## 2024-12-19 RX ORDER — TRAZODONE HYDROCHLORIDE 50 MG/1
25 TABLET, FILM COATED ORAL NIGHTLY
Status: DISCONTINUED | OUTPATIENT
Start: 2024-12-19 | End: 2024-12-21 | Stop reason: HOSPADM

## 2024-12-19 RX ORDER — CITALOPRAM HYDROBROMIDE 20 MG/1
40 TABLET ORAL DAILY
Status: DISCONTINUED | OUTPATIENT
Start: 2024-12-19 | End: 2024-12-21 | Stop reason: HOSPADM

## 2024-12-19 RX ORDER — BISACODYL 10 MG
10 SUPPOSITORY, RECTAL RECTAL DAILY PRN
Status: DISCONTINUED | OUTPATIENT
Start: 2024-12-19 | End: 2024-12-21 | Stop reason: HOSPADM

## 2024-12-19 RX ORDER — 0.9 % SODIUM CHLORIDE 0.9 %
500 INTRAVENOUS SOLUTION INTRAVENOUS ONCE
Status: DISCONTINUED | OUTPATIENT
Start: 2024-12-19 | End: 2024-12-20

## 2024-12-19 RX ORDER — ENOXAPARIN SODIUM 100 MG/ML
40 INJECTION SUBCUTANEOUS DAILY
Status: DISCONTINUED | OUTPATIENT
Start: 2024-12-19 | End: 2024-12-21 | Stop reason: HOSPADM

## 2024-12-19 RX ORDER — SUMATRIPTAN 50 MG/1
100 TABLET, FILM COATED ORAL DAILY PRN
Status: DISCONTINUED | OUTPATIENT
Start: 2024-12-19 | End: 2024-12-20

## 2024-12-19 RX ORDER — ONDANSETRON 2 MG/ML
4 INJECTION INTRAMUSCULAR; INTRAVENOUS EVERY 6 HOURS PRN
Status: DISCONTINUED | OUTPATIENT
Start: 2024-12-19 | End: 2024-12-21 | Stop reason: HOSPADM

## 2024-12-19 RX ORDER — OXYCODONE AND ACETAMINOPHEN 5; 325 MG/1; MG/1
1 TABLET ORAL 2 TIMES DAILY
Status: DISCONTINUED | OUTPATIENT
Start: 2024-12-19 | End: 2024-12-21 | Stop reason: HOSPADM

## 2024-12-19 RX ORDER — SODIUM CHLORIDE 0.9 % (FLUSH) 0.9 %
5-40 SYRINGE (ML) INJECTION EVERY 12 HOURS SCHEDULED
Status: DISCONTINUED | OUTPATIENT
Start: 2024-12-19 | End: 2024-12-21 | Stop reason: HOSPADM

## 2024-12-19 RX ORDER — DEXTROAMPHETAMINE SACCHARATE, AMPHETAMINE ASPARTATE MONOHYDRATE, DEXTROAMPHETAMINE SULFATE AND AMPHETAMINE SULFATE 7.5; 7.5; 7.5; 7.5 MG/1; MG/1; MG/1; MG/1
30 CAPSULE, EXTENDED RELEASE ORAL EVERY MORNING
Status: DISCONTINUED | OUTPATIENT
Start: 2024-12-20 | End: 2024-12-21 | Stop reason: HOSPADM

## 2024-12-19 RX ORDER — MAGNESIUM SULFATE IN WATER 40 MG/ML
2000 INJECTION, SOLUTION INTRAVENOUS PRN
Status: DISCONTINUED | OUTPATIENT
Start: 2024-12-19 | End: 2024-12-21 | Stop reason: HOSPADM

## 2024-12-19 RX ORDER — ACETAMINOPHEN 325 MG/1
650 TABLET ORAL EVERY 6 HOURS PRN
Status: DISCONTINUED | OUTPATIENT
Start: 2024-12-19 | End: 2024-12-21 | Stop reason: HOSPADM

## 2024-12-19 RX ORDER — MAGNESIUM HYDROXIDE/ALUMINUM HYDROXICE/SIMETHICONE 120; 1200; 1200 MG/30ML; MG/30ML; MG/30ML
30 SUSPENSION ORAL EVERY 6 HOURS PRN
Status: DISCONTINUED | OUTPATIENT
Start: 2024-12-19 | End: 2024-12-21 | Stop reason: HOSPADM

## 2024-12-19 RX ORDER — POTASSIUM CHLORIDE 7.45 MG/ML
10 INJECTION INTRAVENOUS PRN
Status: DISCONTINUED | OUTPATIENT
Start: 2024-12-19 | End: 2024-12-19 | Stop reason: SDUPTHER

## 2024-12-19 RX ORDER — SODIUM CHLORIDE 0.9 % (FLUSH) 0.9 %
5-40 SYRINGE (ML) INJECTION PRN
Status: DISCONTINUED | OUTPATIENT
Start: 2024-12-19 | End: 2024-12-21 | Stop reason: HOSPADM

## 2024-12-19 RX ORDER — POTASSIUM CHLORIDE 1500 MG/1
40 TABLET, EXTENDED RELEASE ORAL PRN
Status: DISCONTINUED | OUTPATIENT
Start: 2024-12-19 | End: 2024-12-21 | Stop reason: HOSPADM

## 2024-12-19 RX ORDER — ACETAMINOPHEN 500 MG
1000 TABLET ORAL
Status: COMPLETED | OUTPATIENT
Start: 2024-12-19 | End: 2024-12-19

## 2024-12-19 RX ORDER — LEVOTHYROXINE SODIUM 100 UG/1
200 TABLET ORAL DAILY
Status: DISCONTINUED | OUTPATIENT
Start: 2024-12-20 | End: 2024-12-21 | Stop reason: HOSPADM

## 2024-12-19 RX ADMIN — SODIUM CHLORIDE, PRESERVATIVE FREE 10 ML: 5 INJECTION INTRAVENOUS at 20:51

## 2024-12-19 RX ADMIN — CLONAZEPAM 0.5 MG: 1 TABLET ORAL at 17:48

## 2024-12-19 RX ADMIN — ENOXAPARIN SODIUM 40 MG: 100 INJECTION SUBCUTANEOUS at 17:48

## 2024-12-19 RX ADMIN — CLONAZEPAM 0.5 MG: 1 TABLET ORAL at 22:33

## 2024-12-19 RX ADMIN — ONDANSETRON 4 MG: 4 TABLET, ORALLY DISINTEGRATING ORAL at 17:48

## 2024-12-19 RX ADMIN — SODIUM CHLORIDE, PRESERVATIVE FREE 10 ML: 5 INJECTION INTRAVENOUS at 20:50

## 2024-12-19 RX ADMIN — ACETAMINOPHEN 1000 MG: 500 TABLET, FILM COATED ORAL at 09:05

## 2024-12-19 RX ADMIN — ACETAMINOPHEN 650 MG: 325 TABLET ORAL at 17:48

## 2024-12-19 RX ADMIN — SUMATRIPTAN SUCCINATE 100 MG: 50 TABLET ORAL at 18:37

## 2024-12-19 RX ADMIN — TRAZODONE HYDROCHLORIDE 25 MG: 50 TABLET ORAL at 20:05

## 2024-12-19 RX ADMIN — OXYCODONE HYDROCHLORIDE AND ACETAMINOPHEN 1 TABLET: 5; 325 TABLET ORAL at 20:05

## 2024-12-19 RX ADMIN — HYDRALAZINE HYDROCHLORIDE 20 MG: 20 INJECTION INTRAMUSCULAR; INTRAVENOUS at 19:58

## 2024-12-19 RX ADMIN — ACETAMINOPHEN 650 MG: 325 TABLET ORAL at 22:33

## 2024-12-19 RX ADMIN — KETOROLAC TROMETHAMINE 30 MG: 15 INJECTION, SOLUTION INTRAMUSCULAR; INTRAVENOUS at 23:31

## 2024-12-19 ASSESSMENT — PAIN DESCRIPTION - ORIENTATION
ORIENTATION: MID
ORIENTATION: UPPER
ORIENTATION: MID

## 2024-12-19 ASSESSMENT — PAIN DESCRIPTION - LOCATION
LOCATION: HEAD

## 2024-12-19 ASSESSMENT — PAIN DESCRIPTION - DESCRIPTORS
DESCRIPTORS: ACHING

## 2024-12-19 ASSESSMENT — PAIN SCALES - GENERAL
PAINLEVEL_OUTOF10: 4
PAINLEVEL_OUTOF10: 2
PAINLEVEL_OUTOF10: 10
PAINLEVEL_OUTOF10: 6
PAINLEVEL_OUTOF10: 9
PAINLEVEL_OUTOF10: 10
PAINLEVEL_OUTOF10: 9

## 2024-12-19 ASSESSMENT — LIFESTYLE VARIABLES
HOW OFTEN DO YOU HAVE A DRINK CONTAINING ALCOHOL: NEVER
HOW MANY STANDARD DRINKS CONTAINING ALCOHOL DO YOU HAVE ON A TYPICAL DAY: PATIENT DOES NOT DRINK

## 2024-12-19 ASSESSMENT — PAIN - FUNCTIONAL ASSESSMENT: PAIN_FUNCTIONAL_ASSESSMENT: PREVENTS OR INTERFERES SOME ACTIVE ACTIVITIES AND ADLS

## 2024-12-19 ASSESSMENT — PAIN DESCRIPTION - PAIN TYPE: TYPE: ACUTE PAIN

## 2024-12-19 NOTE — ED TRIAGE NOTES
Pt c/o low HR, and htn. Reports CP and SOB with dizziness. Reports fevers and chills. Pt recently admitted. Pt states HR was 40s at home and systolics of 180s

## 2024-12-19 NOTE — PLAN OF CARE
4 Eyes Skin Assessment     NAME:  Laura Orellana  YOB: 1990  MEDICAL RECORD NUMBER:  [id number]    The patient is being assessed for  Admission    I agree that at least one RN has performed a thorough Head to Toe Skin Assessment on the patient. ALL assessment sites listed below have been assessed.      Areas assessed by both nurses:    Head, Face, Ears, Shoulders, Back, Chest, Arms, Elbows, Hands, Sacrum. Buttock, Coccyx, Ischium, and Legs. Feet and Heels        Does the Patient have a Wound? No noted wound(s)       Quinn Prevention initiated by RN: No  Wound Care Orders initiated by RN: No    Pressure Injury (Stage 3,4, Unstageable, DTI, NWPT, and Complex wounds) if present, place Wound referral order by RN under : No    New Ostomies, if present place, Ostomy referral order under : No     Nurse 1 eSignature: Electronically signed by CHANTE POOLE RN on 12/19/24 at 6:23 PM EST    **SHARE this note so that the co-signing nurse can place an eSignature**    Nurse 2 eSignature: Electronically signed by ADITHYA JAUREGUI RN on 12/19/24 at 6:26 PM EST

## 2024-12-19 NOTE — ED PROVIDER NOTES
Emergency Department Provider Note       PCP: Rafi Velasquez MD   Age: 34 y.o.   Sex: female     DISPOSITION Admitted 12/19/2024 02:38:51 PM                ICD-10-CM    1. Symptomatic bradycardia  R00.1           Medical Decision Making     34-year-old female with recent hospitalization for symptomatic bradycardia.  After extended stay and adjustment of her thyroid medications, she was discharged home.  Symptoms returned several days after discharge.  Several EKGs here showing heart rate of 39-41.  Patient is symptomatic with fatigue, near syncopal.  Given her persistence of symptomatic bradycardia with no clear etiology, patient will need to be admitted for further workup and evaluation.  I discussed case with hospitalist given that they were the service to admit the patient on her last hospitalization for symptomatic bradycardia.  After review of case, they will admit patient for further workup.    ED Course as of 12/20/24 0850   Thu Dec 19, 2024   1146 Repeat EKG shows a ventricular rate of 40 with a OH interval of 130.  Marked sinus bradycardia.  Patient does feel symptomatic at this time with malaise and general unwell feeling.  Will plan for cardiac admit. [MO]      ED Course User Index  [MO] El Lane PA     1 or more acute illnesses that pose a threat to life or bodily function.   Over the counter drug management performed.  Shared medical decision making was utilized in creating the patients health plan today.  I independently ordered and reviewed each unique test.  ED provider's independent EKG interpretation rate of 68 with a OH interval of 122, no ectopic beats.  No ST elevation  The patient was admitted and I have discussed patient management with the admitting provider.  The management of this patient was discussed with an external consultant.      History     This is a 34-year-old female who presents to this department with complaints of elevated blood pressure readings and lower heart

## 2024-12-19 NOTE — CONSULTS
[facility] Cardiology Initial Cardiac Evaluation                Date of  Admission: 12/19/2024  8:32 AM     PCP: Rafi Velasquez MD  Requested by: Dr Hawk  Primary Cardiologist: Dr Cobos at [facility]  [facility] Attending: Dr Kumar    Reason for Evaluation: Bradycardia      Laura Orellana is a 34 y.o. female with hx of anxiety, Crohn's disease, depression, ASD by echocardiogram, PTSD, bipolar disorder, POTS, bradycardia cardia, autonomic dysfunction, migraines, and hypothyroidism.  Patient was previously on midodrine, propranolol, and Florinef  but due to bradycardia propranolol was discontinued by her primary cardiologist.  At the time patient was having some presyncopal and syncopal episodes.  Patient came to the emergency room with intermittent bradycardia presented with general weakness fatigue shortness of breath with intermittent chest discomfort.  Patient was noted to be in sinus bradycardia cardia with no arrhythmias.  Patient was found to have severe hypothyroidism and was started on IV Synthroid with endocrinology consulted as well.  Per the patient she had now 4 hospitalizations in the past year due to bradycardia all in the setting of having problems with her thyroid with hypothyroidism.  Her last hospital admission her main complaints at time are mild atypical chest pain, shortness of breath, weakness, fatigue, and syncopal presyncopal episodes.  Currently review of telemetry shows heart rate has improved from the low 30s to 50s to 60s after IV Synthroid with no arrhythmias noted.  Her midodrine was stopped at discharge and she is not on rate slowing meds in the outpatient setting and none currently in the inpatient setting as well.  The patient was recommended to be on 200 mcg of synthroid at discharge but insurance is not covering this prescription.  Tirosint was also tried but was not covered as well.  The patient came back to the ED with complaints of a migraine HA and was treated and discharged after a  8:47 AM   Result Value Ref Range    Sodium 140 136 - 145 mmol/L    Potassium 4.1 3.5 - 5.1 mmol/L    Chloride 104 98 - 107 mmol/L    CO2 26 20 - 29 mmol/L    Anion Gap 10 7 - 16 mmol/L    Glucose 62 (L) 70 - 99 mg/dL    BUN 22 6 - 23 MG/DL    Creatinine 0.96 0.60 - 1.10 MG/DL    Est, Glom Filt Rate 78 >60 ml/min/1.73m2    Calcium 9.4 8.8 - 10.2 MG/DL    Total Bilirubin 0.4 0.0 - 1.2 MG/DL    ALT 86 (H) 8 - 45 U/L    AST 44 (H) 15 - 37 U/L    Alk Phosphatase 85 35 - 104 U/L    Total Protein 7.7 6.3 - 8.2 g/dL    Albumin 3.8 3.5 - 5.0 g/dL    Globulin 4.0 (H) 2.3 - 3.5 g/dL    Albumin/Globulin Ratio 0.9 (L) 1.0 - 1.9     Troponin    Collection Time: 12/19/24  8:47 AM   Result Value Ref Range    Troponin T 6.0 0 - 14 ng/L   TSH    Collection Time: 12/19/24  8:47 AM   Result Value Ref Range    TSH, 3rd Generation 1.790 0.270 - 4.200 uIU/mL   T4, Free    Collection Time: 12/19/24  8:47 AM   Result Value Ref Range    T4 Free 1.0 0.9 - 1.7 NG/DL   POCT Glucose    Collection Time: 12/19/24 10:30 AM   Result Value Ref Range    POC Glucose 109 (H) 65 - 100 mg/dL    Performed by: Joleen    Troponin now then q90 min for 2 occurances    Collection Time: 12/19/24 10:32 AM   Result Value Ref Range    Troponin T 9.0 0 - 14 ng/L   POCT Glucose    Collection Time: 12/19/24 10:33 AM   Result Value Ref Range    QC OK? 109    EKG 12 Lead    Collection Time: 12/19/24 11:43 AM   Result Value Ref Range    Ventricular Rate 40 BPM    Atrial Rate 40 BPM    P-R Interval 130 ms    QRS Duration 82 ms    Q-T Interval 510 ms    QTc Calculation (Bazett) 415 ms    P Axis 92 degrees    R Axis 22 degrees    T Axis 58 degrees    Diagnosis       Marked sinus bradycardia  Abnormal ECG  When compared with ECG of 19-DEC-2024 11:43,  No significant change was found          XR CHEST (2 VW)    Result Date: 12/19/2024  Chest X-ray INDICATION:  chest pain COMPARISON:  xray chest 12/14/2024  TECHNIQUE: PA and lateral views of the chest were

## 2024-12-19 NOTE — H&P
Hospitalist History and Physical   Admit Date:  2024  8:32 AM   Name:  Laura Orellana   Age:  34 y.o.  Sex:  female  :  1990   MRN:  [id number]   Room:  ER    Presenting/Chief Complaint: Dizziness  Reason(s) for Admission: Symptomatic bradycardia [R00.1]     History of Present Illness:   Laura Orellana is a 34 y.o. female with medical history of Crohn's disease, severe hypothyroidism, ASD with right to left shunting, who presented with dizziness.  Patient reports she woke up this morning with a headache.  She checked her blood pressure and said it was in the 170s to 180s.  She felt like she had chest pressure and shortness of breath and dizziness.  Her heart rate was in the 40s.  Came to the ER for evaluation.  Recently admitted from  to  with symptomatic bradycardia.  Cardiology was consulted and patient was given IV Synthroid.  She said she was switched from standard Synthroid tablets to gelcaps and since then her thyroid level has been within normal limits.  TSH and T4 were normal today.  She says that her symptoms come on randomly with the bradycardia and not necessarily triggered by any exertion or activity.  She was seen by cardiology in the office  continued to have chest pressure and other symptoms at that time as well.      Assessment & Plan:       Symptomatic sinus bradycardia  -Observation  -Telemetry  -Cardiology consult for pacer reconsideration      Acquired hypothyroidism  -Continue home Synthroid; increased to 200mcg by endocrinology per last discharge summary      Crohn disease (HCC)  -Not in exacerbation does not take meds at home      ASD (atrial septal defect)  -Per cardiology note \"Only a weak recommendation for closure for patients age <=60 years with an embolic-appearing cryptogenic ischemic stroke (ie, no evident source of stroke despite a comprehensive evaluation) who have a PFO with a right-to-left shunt detected by bubble study: The patient does  IVPB premix    OR Linked Order Group     ondansetron (ZOFRAN-ODT) disintegrating tablet 4 mg     ondansetron (ZOFRAN) injection 4 mg    melatonin tablet 3 mg    polyethylene glycol (GLYCOLAX) packet 17 g    bisacodyl (DULCOLAX) suppository 10 mg    famotidine (PEPCID) tablet 10 mg    aluminum & magnesium hydroxide-simethicone (MAALOX) 200-200-20 MG/5ML suspension 30 mL    OR Linked Order Group     acetaminophen (TYLENOL) tablet 650 mg     acetaminophen (TYLENOL) suppository 650 mg    enoxaparin (LOVENOX) injection 40 mg     Order Specific Question:   Indication of Use     Answer:   Prophylaxis-DVT/PE       Prior to Admit Medications:  Current Outpatient Medications   Medication Instructions    amphetamine-dextroamphetamine (ADDERALL XR) 30 MG extended release capsule 30 mg, Oral, EVERY MORNING    citalopram (CELEXA) 40 mg, Oral, DAILY    clonazePAM (KLONOPIN) 0.5 mg, Oral, 3 TIMES DAILY PRN    droxidopa (NORTHERA) 100 mg, Oral, 3 TIMES DAILY    fludrocortisone (FLORINEF) 0.1 mg, Oral, DAILY    lamoTRIgine (LAMICTAL) 25 mg, Oral, DAILY, 2 tablets    Levothyroxine Sodium 100 mcg, Oral, DAILY    naproxen (NAPROSYN) 500 mg, Oral, 2 TIMES DAILY PRN    omeprazole (PRILOSEC) 40 mg, Oral, DAILY    oxyCODONE-acetaminophen (PERCOCET) 5-325 MG per tablet 1 tablet, Oral, 2 TIMES DAILY    Qulipta 60 mg, Oral, PRN    SUMAtriptan (IMITREX) 100 MG tablet PRN    traZODone (DESYREL) 25 mg, Oral, NIGHTLY       I have personally reviewed labs and tests:  Recent Results (from the past 24 hour(s))   EKG 12 Lead    Collection Time: 12/19/24  8:34 AM   Result Value Ref Range    Ventricular Rate 68 BPM    Atrial Rate 68 BPM    P-R Interval 122 ms    QRS Duration 86 ms    Q-T Interval 418 ms    QTc Calculation (Bazett) 444 ms    P Axis 77 degrees    R Axis 34 degrees    T Axis 49 degrees    Diagnosis       Normal sinus rhythm  Normal ECG  When compared with ECG of 14-DEC-2024 17:56,  Prior EKG with sinus bradycardia  Confirmed by MD ZEKE

## 2024-12-19 NOTE — ED NOTES
TRANSFER - OUT REPORT:    Verbal report given to MAYNOR Marquez on Laura Orellana  being transferred to [facility] for routine progression of patient care       Report consisted of patient's Situation, Background, Assessment and   Recommendations(SBAR).     Information from the following report(s) ED Encounter Summary and ED SBAR was reviewed with the receiving nurse.    [place] Fall Assessment:    Presents to emergency department  because of falls (Syncope, seizure, or loss of consciousness): No  Age > 70: No  Altered Mental Status, Intoxication with alcohol or substance confusion (Disorientation, impaired judgment, poor safety awaremess, or inability to follow instructions): No  Impaired Mobility: Ambulates or transfers with assistive devices or assistance; Unable to ambulate or transer.: No             Lines:   Peripheral IV Left Antecubital (Active)        Opportunity for questions and clarification was provided.      Patient transported with:  Registered Nurse          Clarissa García RN  12/19/24 7748

## 2024-12-20 ENCOUNTER — TELEPHONE (OUTPATIENT)
Age: 34
End: 2024-12-20

## 2024-12-20 PROBLEM — G43.009 MIGRAINE WITHOUT AURA OR STATUS MIGRAINOSUS: Chronic | Status: ACTIVE | Noted: 2023-07-26

## 2024-12-20 LAB
ANION GAP SERPL CALC-SCNC: 9 MMOL/L (ref 7–16)
BASOPHILS # BLD: 0 K/UL (ref 0–0.2)
BASOPHILS NFR BLD: 1 % (ref 0–2)
BUN SERPL-MCNC: 18 MG/DL (ref 6–23)
CALCIUM SERPL-MCNC: 9.2 MG/DL (ref 8.8–10.2)
CHLORIDE SERPL-SCNC: 104 MMOL/L (ref 98–107)
CO2 SERPL-SCNC: 24 MMOL/L (ref 20–29)
CREAT SERPL-MCNC: 0.89 MG/DL (ref 0.6–1.1)
DIFFERENTIAL METHOD BLD: NORMAL
EOSINOPHIL # BLD: 0.2 K/UL (ref 0–0.8)
EOSINOPHIL NFR BLD: 3 % (ref 0.5–7.8)
ERYTHROCYTE [DISTWIDTH] IN BLOOD BY AUTOMATED COUNT: 13.1 % (ref 11.9–14.6)
GLUCOSE SERPL-MCNC: 96 MG/DL (ref 70–99)
HCT VFR BLD AUTO: 41.7 % (ref 35.8–46.3)
HGB BLD-MCNC: 13.4 G/DL (ref 11.7–15.4)
IMM GRANULOCYTES # BLD AUTO: 0.1 K/UL (ref 0–0.5)
IMM GRANULOCYTES NFR BLD AUTO: 1 % (ref 0–5)
LYMPHOCYTES # BLD: 1.9 K/UL (ref 0.5–4.6)
LYMPHOCYTES NFR BLD: 23 % (ref 13–44)
MCH RBC QN AUTO: 30.6 PG (ref 26.1–32.9)
MCHC RBC AUTO-ENTMCNC: 32.1 G/DL (ref 31.4–35)
MCV RBC AUTO: 95.2 FL (ref 82–102)
MONOCYTES # BLD: 0.7 K/UL (ref 0.1–1.3)
MONOCYTES NFR BLD: 8 % (ref 4–12)
NEUTS SEG # BLD: 5.5 K/UL (ref 1.7–8.2)
NEUTS SEG NFR BLD: 64 % (ref 43–78)
NRBC # BLD: 0 K/UL (ref 0–0.2)
PLATELET # BLD AUTO: 379 K/UL (ref 150–450)
PMV BLD AUTO: 9.8 FL (ref 9.4–12.3)
POTASSIUM SERPL-SCNC: 4.1 MMOL/L (ref 3.5–5.1)
RBC # BLD AUTO: 4.38 M/UL (ref 4.05–5.2)
SODIUM SERPL-SCNC: 138 MMOL/L (ref 136–145)
WBC # BLD AUTO: 8.4 K/UL (ref 4.3–11.1)

## 2024-12-20 PROCEDURE — 96376 TX/PRO/DX INJ SAME DRUG ADON: CPT

## 2024-12-20 PROCEDURE — 36415 COLL VENOUS BLD VENIPUNCTURE: CPT

## 2024-12-20 PROCEDURE — 6360000002 HC RX W HCPCS: Performed by: INTERNAL MEDICINE

## 2024-12-20 PROCEDURE — 80048 BASIC METABOLIC PNL TOTAL CA: CPT

## 2024-12-20 PROCEDURE — 6360000002 HC RX W HCPCS: Performed by: FAMILY MEDICINE

## 2024-12-20 PROCEDURE — 2500000003 HC RX 250 WO HCPCS: Performed by: INTERNAL MEDICINE

## 2024-12-20 PROCEDURE — 85025 COMPLETE CBC W/AUTO DIFF WBC: CPT

## 2024-12-20 PROCEDURE — 6370000000 HC RX 637 (ALT 250 FOR IP): Performed by: INTERNAL MEDICINE

## 2024-12-20 PROCEDURE — G0378 HOSPITAL OBSERVATION PER HR: HCPCS

## 2024-12-20 PROCEDURE — 96372 THER/PROPH/DIAG INJ SC/IM: CPT

## 2024-12-20 PROCEDURE — 6370000000 HC RX 637 (ALT 250 FOR IP)

## 2024-12-20 PROCEDURE — 87081 CULTURE SCREEN ONLY: CPT

## 2024-12-20 PROCEDURE — 6370000000 HC RX 637 (ALT 250 FOR IP): Performed by: FAMILY MEDICINE

## 2024-12-20 RX ORDER — HYDRALAZINE HYDROCHLORIDE 20 MG/ML
10 INJECTION INTRAMUSCULAR; INTRAVENOUS EVERY 4 HOURS PRN
Status: DISCONTINUED | OUTPATIENT
Start: 2024-12-20 | End: 2024-12-20

## 2024-12-20 RX ORDER — SUMATRIPTAN 50 MG/1
100 TABLET, FILM COATED ORAL 2 TIMES DAILY PRN
Status: DISCONTINUED | OUTPATIENT
Start: 2024-12-20 | End: 2024-12-21 | Stop reason: HOSPADM

## 2024-12-20 RX ORDER — BUTALBITAL, ACETAMINOPHEN AND CAFFEINE 50; 325; 40 MG/1; MG/1; MG/1
1 TABLET ORAL EVERY 4 HOURS PRN
Status: DISCONTINUED | OUTPATIENT
Start: 2024-12-20 | End: 2024-12-21 | Stop reason: HOSPADM

## 2024-12-20 RX ORDER — HYDRALAZINE HYDROCHLORIDE 20 MG/ML
10 INJECTION INTRAMUSCULAR; INTRAVENOUS EVERY 6 HOURS PRN
Status: DISCONTINUED | OUTPATIENT
Start: 2024-12-20 | End: 2024-12-20

## 2024-12-20 RX ORDER — KETOROLAC TROMETHAMINE 15 MG/ML
30 INJECTION, SOLUTION INTRAMUSCULAR; INTRAVENOUS EVERY 8 HOURS PRN
Status: DISCONTINUED | OUTPATIENT
Start: 2024-12-20 | End: 2024-12-20

## 2024-12-20 RX ORDER — KETOROLAC TROMETHAMINE 15 MG/ML
30 INJECTION, SOLUTION INTRAMUSCULAR; INTRAVENOUS EVERY 6 HOURS PRN
Status: DISCONTINUED | OUTPATIENT
Start: 2024-12-20 | End: 2024-12-21 | Stop reason: HOSPADM

## 2024-12-20 RX ORDER — HYDRALAZINE HYDROCHLORIDE 20 MG/ML
5 INJECTION INTRAMUSCULAR; INTRAVENOUS EVERY 6 HOURS PRN
Status: DISCONTINUED | OUTPATIENT
Start: 2024-12-20 | End: 2024-12-21 | Stop reason: HOSPADM

## 2024-12-20 RX ADMIN — ONDANSETRON 4 MG: 4 TABLET, ORALLY DISINTEGRATING ORAL at 07:50

## 2024-12-20 RX ADMIN — TRAZODONE HYDROCHLORIDE 25 MG: 50 TABLET ORAL at 20:37

## 2024-12-20 RX ADMIN — ENOXAPARIN SODIUM 40 MG: 100 INJECTION SUBCUTANEOUS at 07:55

## 2024-12-20 RX ADMIN — LEVOTHYROXINE SODIUM 200 MCG: 0.1 TABLET ORAL at 05:53

## 2024-12-20 RX ADMIN — SUMATRIPTAN SUCCINATE 100 MG: 50 TABLET ORAL at 19:18

## 2024-12-20 RX ADMIN — ACETAMINOPHEN 650 MG: 325 TABLET ORAL at 14:18

## 2024-12-20 RX ADMIN — PHENOL 1 SPRAY: 1.5 LIQUID ORAL at 21:28

## 2024-12-20 RX ADMIN — OXYCODONE HYDROCHLORIDE AND ACETAMINOPHEN 1 TABLET: 5; 325 TABLET ORAL at 20:37

## 2024-12-20 RX ADMIN — KETOROLAC TROMETHAMINE 30 MG: 15 INJECTION, SOLUTION INTRAMUSCULAR; INTRAVENOUS at 15:03

## 2024-12-20 RX ADMIN — BUTALBITAL, ACETAMINOPHEN, AND CAFFEINE 1 TABLET: 325; 50; 40 TABLET ORAL at 15:46

## 2024-12-20 RX ADMIN — KETOROLAC TROMETHAMINE 30 MG: 15 INJECTION, SOLUTION INTRAMUSCULAR; INTRAVENOUS at 09:22

## 2024-12-20 RX ADMIN — CLONAZEPAM 0.5 MG: 1 TABLET ORAL at 14:18

## 2024-12-20 RX ADMIN — SODIUM CHLORIDE, PRESERVATIVE FREE 10 ML: 5 INJECTION INTRAVENOUS at 20:37

## 2024-12-20 RX ADMIN — OXYCODONE HYDROCHLORIDE AND ACETAMINOPHEN 1 TABLET: 5; 325 TABLET ORAL at 07:55

## 2024-12-20 RX ADMIN — LAMOTRIGINE 50 MG: 25 TABLET ORAL at 07:54

## 2024-12-20 RX ADMIN — PANTOPRAZOLE SODIUM 40 MG: 40 TABLET, DELAYED RELEASE ORAL at 05:53

## 2024-12-20 RX ADMIN — HYDRALAZINE HYDROCHLORIDE 10 MG: 20 INJECTION INTRAMUSCULAR; INTRAVENOUS at 09:22

## 2024-12-20 RX ADMIN — ACETAMINOPHEN 650 MG: 325 TABLET ORAL at 05:53

## 2024-12-20 RX ADMIN — CLONAZEPAM 0.5 MG: 1 TABLET ORAL at 05:53

## 2024-12-20 RX ADMIN — SODIUM CHLORIDE, PRESERVATIVE FREE 10 ML: 5 INJECTION INTRAVENOUS at 07:57

## 2024-12-20 RX ADMIN — CITALOPRAM HYDROBROMIDE 40 MG: 20 TABLET ORAL at 07:55

## 2024-12-20 RX ADMIN — ONDANSETRON 4 MG: 2 INJECTION, SOLUTION INTRAMUSCULAR; INTRAVENOUS at 19:18

## 2024-12-20 RX ADMIN — SUMATRIPTAN SUCCINATE 100 MG: 50 TABLET ORAL at 07:55

## 2024-12-20 ASSESSMENT — PAIN SCALES - GENERAL
PAINLEVEL_OUTOF10: 6
PAINLEVEL_OUTOF10: 0
PAINLEVEL_OUTOF10: 0
PAINLEVEL_OUTOF10: 7
PAINLEVEL_OUTOF10: 6
PAINLEVEL_OUTOF10: 10
PAINLEVEL_OUTOF10: 7
PAINLEVEL_OUTOF10: 5
PAINLEVEL_OUTOF10: 8
PAINLEVEL_OUTOF10: 10
PAINLEVEL_OUTOF10: 8
PAINLEVEL_OUTOF10: 8
PAINLEVEL_OUTOF10: 0
PAINLEVEL_OUTOF10: 0
PAINLEVEL_OUTOF10: 6

## 2024-12-20 ASSESSMENT — PAIN DESCRIPTION - LOCATION
LOCATION: HEAD
LOCATION: CHEST;ARM;NECK
LOCATION: HEAD

## 2024-12-20 ASSESSMENT — PAIN DESCRIPTION - DESCRIPTORS
DESCRIPTORS: ACHING
DESCRIPTORS: SORE
DESCRIPTORS: ACHING
DESCRIPTORS: SORE
DESCRIPTORS: ACHING
DESCRIPTORS: SORE
DESCRIPTORS: SORE
DESCRIPTORS: ACHING;DISCOMFORT

## 2024-12-20 ASSESSMENT — PAIN DESCRIPTION - ORIENTATION
ORIENTATION: MID
ORIENTATION: RIGHT
ORIENTATION: MID

## 2024-12-20 ASSESSMENT — PAIN - FUNCTIONAL ASSESSMENT
PAIN_FUNCTIONAL_ASSESSMENT: ACTIVITIES ARE NOT PREVENTED

## 2024-12-20 NOTE — PLAN OF CARE
Problem: Discharge Planning  Goal: Discharge to home or other facility with appropriate resources  12/20/2024 0952 by Jimmy Mosher RN  Outcome: Progressing  12/20/2024 0612 by Noel Brown RN  Outcome: Progressing     Problem: Pain  Goal: Verbalizes/displays adequate comfort level or baseline comfort level  12/20/2024 0952 by Jimmy Mosher RN  Outcome: Progressing  12/20/2024 0612 by Noel Brown, RN  Outcome: Progressing     Problem: Safety - Adult  Goal: Free from fall injury  12/20/2024 0952 by Jimmy Mosher RN  Outcome: Progressing  12/20/2024 0612 by Noel Brown RN  Outcome: Progressing

## 2024-12-20 NOTE — PROGRESS NOTES
Hospitalist Progress Note   Admit Date:  2024  8:32 AM   Name:  Laura Orellana   Age:  34 y.o.  Sex:  female  :  1990   MRN:  [id number]   Room:      Presenting/Chief Complaint: Chest Pain     Reason(s) for Admission: Symptomatic bradycardia [R00.1]     Hospital Day #0      Hospital Course:   Laura Orellana is a 34 y.o. female with medical history of Crohn's disease, severe hypothyroidism, ASD with right to left shunting, who presented with dizziness.  Patient reports she woke up this morning with a headache.  She checked her blood pressure and said it was in the 170s to 180s.  She felt like she had chest pressure and shortness of breath and dizziness.  Her heart rate was in the 40s.  Came to the ER for evaluation.  Recently admitted from  to  with symptomatic bradycardia.  Cardiology was consulted and patient was given IV Synthroid.  She said she was switched from standard Synthroid tablets to gelcaps and since then her thyroid level has been within normal limits.  TSH and T4 were normal in ER.  She says that her symptoms come on randomly with the bradycardia and not necessarily triggered by any exertion or activity.  She was seen by cardiology in the office  continued to have chest pressure and other symptoms at that time as well.     Subjective & 24hr Events:   Patient lying supine in bed, on her left side.  Complains of migraine headache that has been moderately improved with IV Toradol and continuation of home Imitrex.  She describes a multitude of somatic symptoms including intermittent chest pressure palpitations and accompanying change in vital signs, which she watches continuously with bedside monitor.  Mother-in-law at bedside and mother present via phone.    Assessment & Plan:     Symptomatic sinus bradycardia  HR has ranged 40-89 over the last 24 hours.  She is on no rate slowing medications.  Evaluated by cardiology/electrophysiology yesterday.  Recommended  midline   CV:   Bradycardic, HR 50s in sinus bradycardia on bedside monitor.  No m/r/g.  No jugular venous distension.  No LE edema.  Lungs:   CTAB.  No wheezing, rhonchi, or rales.  Symmetric expansion.  On room air.  Abdomen:   Soft, nontender, nondistended.  Skin:     No rashes.  Normal coloration.   Warm and dry.    Neuro:  AAOx3.  CN II-XII grossly intact.  Moves all extremities spontaneously.  Psych:  Normal mood and affect.      I have personally reviewed labs and tests:  Recent Labs:  Recent Results (from the past 48 hour(s))   EKG 12 Lead    Collection Time: 12/19/24  8:34 AM   Result Value Ref Range    Ventricular Rate 68 BPM    Atrial Rate 68 BPM    P-R Interval 122 ms    QRS Duration 86 ms    Q-T Interval 418 ms    QTc Calculation (Bazett) 444 ms    P Axis 77 degrees    R Axis 34 degrees    T Axis 49 degrees    Diagnosis       Normal sinus rhythm  Normal ECG  When compared with ECG of 14-DEC-2024 17:56,  Prior EKG with sinus bradycardia  Confirmed by MD ZEKE (), FAIZA ([id number]) on 12/19/2024 8:50:05 AM     CBC with Auto Differential    Collection Time: 12/19/24  8:47 AM   Result Value Ref Range    WBC 7.8 4.3 - 11.1 K/uL    RBC 4.26 4.05 - 5.2 M/uL    Hemoglobin 12.9 11.7 - 15.4 g/dL    Hematocrit 40.5 35.8 - 46.3 %    MCV 95.1 82 - 102 FL    MCH 30.3 26.1 - 32.9 PG    MCHC 31.9 31.4 - 35.0 g/dL    RDW 13.1 11.9 - 14.6 %    Platelets 338 150 - 450 K/uL    MPV 10.0 9.4 - 12.3 FL    nRBC 0.00 0.0 - 0.2 K/uL    Differential Type AUTOMATED      Neutrophils % 74 43 - 78 %    Lymphocytes % 16 13 - 44 %    Monocytes % 8 4.0 - 12.0 %    Eosinophils % 1 0.5 - 7.8 %    Basophils % 0 0.0 - 2.0 %    Immature Granulocytes % 1 0.0 - 5.0 %    Neutrophils Absolute 5.8 1.7 - 8.2 K/UL    Lymphocytes Absolute 1.2 0.5 - 4.6 K/UL    Monocytes Absolute 0.6 0.1 - 1.3 K/UL    Eosinophils Absolute 0.1 0.0 - 0.8 K/UL    Basophils Absolute 0.0 0.0 - 0.2 K/UL    Immature Granulocytes Absolute 0.0 0.0 - 0.5 K/UL   Comprehensive

## 2024-12-20 NOTE — CARE COORDINATION
Case Management Assessment  Initial Evaluation    Date/Time of Evaluation: 12/20/2024 4:08 PM  Assessment Completed by: SAM MARTINEZ    If patient is discharged prior to next notation, then this note serves as note for discharge by case management.    Patient Name: Laura Orellana                   YOB: 1990  Diagnosis:   Symptomatic bradycardia [R00.1]                   Date / Time: 12/19/2024  8:32 AM    Patient Admission Status: Observation   Readmission Risk (Low < 19, Mod (19-27), High > 27): Readmission Risk Score: 6.3      Current PCP: Rafi Velasquez MD  PCP verified by CM? (P) Yes    Chart Reviewed: Yes      History Provided by: (P) Patient  Patient Orientation: (P) Alert and Oriented    Patient Cognition: (P) Alert    Hospitalization in the last 30 days (Readmission):  Yes    If yes, Readmission Assessment in  Navigator will be completed.    Advance Directives:      Code Status: Full Code   Patient's Primary Decision Maker is: (P) Legal Next of Kin      Discharge Planning:    Patient lives with: (P) Children Type of Home: (P) Trailer/Mobile Home  Primary Care Giver: (P) Self  Patient Support Systems include: (P) Parent, Children   Current Financial resources: (P) Medicaid  Current community resources: (P) None  Current services prior to admission: (P) None            Current DME:              Type of Home Care services:       ADLS  Prior functional level: (P) Independent in ADLs/IADLs  Current functional level: (P) Independent in ADLs/IADLs    PT AM-PAC:   /24  OT AM-PAC:   /24    Family can provide assistance at DC: (P) Yes  Would you like Case Management to discuss the discharge plan with any other family members/significant others, and if so, who? (P) Yes  Plans to Return to Present Housing: (P) Yes  Other Identified Issues/Barriers to RETURNING to current housing: pending   Potential Assistance needed at discharge:              Potential DME:  pending   Patient expects to discharge

## 2024-12-20 NOTE — TELEPHONE ENCOUNTER
Nelson Chaudhari MD  You6 minutes ago (3:52 PM)       I would place the order under Dr. Juárez since he recommended it.  Furthermore, I do not have any repport with the EP guys at [facility].  He likely does.  She needs to follow-up with him as an outpatient.  Whether or not she needs a pacemaker ultimately will be EP's call.  Please schedule an appointment to see him.     You  Nelson Chaudhari MD20 minutes ago (3:39 PM)     LK  Pleases see message from JONNY Bustillos. Are you okay with this referral under your name? Please either place referral, or send back to Triage to place referral. Thanks!

## 2024-12-20 NOTE — ACP (ADVANCE CARE PLANNING)
Advance Care Planning     Advance Care Planning Activator (Inpatient)  Conversation Note      Health Care Decision Maker: No LW/HCPOA on file, patient aware legal next of kin remain decision maker until document provided.      Current Designated Health Care Decision Maker:     Primary Decision Maker: Deepthi Orellana - [facility] - [phone]    Primary Decision Maker: Price Orellana - [facility] - [phone]      Care Preferences Full Code per MD order

## 2024-12-20 NOTE — TELEPHONE ENCOUNTER
----- Message from Azra ENGLAND sent at 12/20/2024  3:17 PM EST -----  Regarding: RE: [facility] referral  Fara patient  ----- Message -----  From: Nikos Moore APRN - NP  Sent: 12/20/2024   3:10 PM EST  To: Ifeoma Ivy RN; #  Subject: [facility] referral                                    Please setup outpatient referral to [facility] Electrophysiology with Dr. Bolden for dysautonomia and bradycardia.    Thank you,  [place]

## 2024-12-21 VITALS
DIASTOLIC BLOOD PRESSURE: 66 MMHG | HEIGHT: 63 IN | TEMPERATURE: 99.3 F | RESPIRATION RATE: 17 BRPM | SYSTOLIC BLOOD PRESSURE: 101 MMHG | BODY MASS INDEX: 21.26 KG/M2 | WEIGHT: 120 LBS | HEART RATE: 86 BPM | OXYGEN SATURATION: 98 %

## 2024-12-21 PROCEDURE — 96376 TX/PRO/DX INJ SAME DRUG ADON: CPT

## 2024-12-21 PROCEDURE — 99232 SBSQ HOSP IP/OBS MODERATE 35: CPT | Performed by: INTERNAL MEDICINE

## 2024-12-21 PROCEDURE — 6370000000 HC RX 637 (ALT 250 FOR IP): Performed by: FAMILY MEDICINE

## 2024-12-21 PROCEDURE — 6360000002 HC RX W HCPCS: Performed by: FAMILY MEDICINE

## 2024-12-21 PROCEDURE — G0378 HOSPITAL OBSERVATION PER HR: HCPCS

## 2024-12-21 PROCEDURE — 6370000000 HC RX 637 (ALT 250 FOR IP): Performed by: INTERNAL MEDICINE

## 2024-12-21 PROCEDURE — 6360000002 HC RX W HCPCS: Performed by: INTERNAL MEDICINE

## 2024-12-21 PROCEDURE — 96372 THER/PROPH/DIAG INJ SC/IM: CPT

## 2024-12-21 PROCEDURE — 2500000003 HC RX 250 WO HCPCS: Performed by: INTERNAL MEDICINE

## 2024-12-21 RX ORDER — HYDRALAZINE HYDROCHLORIDE 10 MG/1
5 TABLET, FILM COATED ORAL EVERY 8 HOURS PRN
Qty: 30 TABLET | Refills: 0 | Status: SHIPPED | OUTPATIENT
Start: 2024-12-21

## 2024-12-21 RX ORDER — BUTALBITAL, ACETAMINOPHEN AND CAFFEINE 50; 325; 40 MG/1; MG/1; MG/1
1 TABLET ORAL EVERY 4 HOURS PRN
Qty: 16 TABLET | Refills: 0 | Status: SHIPPED | OUTPATIENT
Start: 2024-12-21

## 2024-12-21 RX ADMIN — KETOROLAC TROMETHAMINE 30 MG: 15 INJECTION, SOLUTION INTRAMUSCULAR; INTRAVENOUS at 01:41

## 2024-12-21 RX ADMIN — OXYCODONE HYDROCHLORIDE AND ACETAMINOPHEN 1 TABLET: 5; 325 TABLET ORAL at 08:12

## 2024-12-21 RX ADMIN — KETOROLAC TROMETHAMINE 30 MG: 15 INJECTION, SOLUTION INTRAMUSCULAR; INTRAVENOUS at 11:14

## 2024-12-21 RX ADMIN — CITALOPRAM HYDROBROMIDE 40 MG: 20 TABLET ORAL at 08:12

## 2024-12-21 RX ADMIN — SODIUM CHLORIDE, PRESERVATIVE FREE 10 ML: 5 INJECTION INTRAVENOUS at 08:13

## 2024-12-21 RX ADMIN — LAMOTRIGINE 50 MG: 25 TABLET ORAL at 08:12

## 2024-12-21 RX ADMIN — SUMATRIPTAN SUCCINATE 100 MG: 50 TABLET ORAL at 05:58

## 2024-12-21 RX ADMIN — ENOXAPARIN SODIUM 40 MG: 100 INJECTION SUBCUTANEOUS at 08:13

## 2024-12-21 RX ADMIN — LEVOTHYROXINE SODIUM 200 MCG: 0.1 TABLET ORAL at 05:58

## 2024-12-21 RX ADMIN — PANTOPRAZOLE SODIUM 40 MG: 40 TABLET, DELAYED RELEASE ORAL at 05:58

## 2024-12-21 RX ADMIN — ONDANSETRON 4 MG: 2 INJECTION, SOLUTION INTRAMUSCULAR; INTRAVENOUS at 05:58

## 2024-12-21 ASSESSMENT — PAIN DESCRIPTION - DESCRIPTORS
DESCRIPTORS: ACHING
DESCRIPTORS: ACHING
DESCRIPTORS: ACHING;DISCOMFORT

## 2024-12-21 ASSESSMENT — PAIN SCALES - GENERAL
PAINLEVEL_OUTOF10: 0
PAINLEVEL_OUTOF10: 7
PAINLEVEL_OUTOF10: 7
PAINLEVEL_OUTOF10: 4
PAINLEVEL_OUTOF10: 0
PAINLEVEL_OUTOF10: 5

## 2024-12-21 ASSESSMENT — PAIN DESCRIPTION - LOCATION
LOCATION: HEAD

## 2024-12-21 NOTE — CARE COORDINATION
Pt is for discharge home today with family and no needs/supportive care orders recieved for MSW at this time.     12/21/24 6687   Service Assessment   Patient's Healthcare Decision Maker is: Legal Next of Kin   Social/Functional History   Lives With Other (Comment)  (small children)   Type of Home Mobile home   Home Layout One level   Home Access Stairs to enter with rails   Entrance Stairs - Number of Steps 3   Bathroom Shower/Tub Tub/Shower unit   Bathroom Toilet Standard   Bathroom Equipment Commode   Bathroom Accessibility Accessible   Home Equipment None   Receives Help From Family   Prior Level of Assist for ADLs Independent   Prior Level of Assist for Homemaking Independent   Homemaking Responsibilities Yes   Ambulation Assistance Independent   Prior Level of Assist for Transfers Independent   Active  Yes   Services At/After Discharge   Transition of Care Consult (CM Consult) Discharge Planning   Services At/After Discharge None   [place] Resource Information Provided? No   Mode of Transport at Discharge   (family)   Confirm Follow Up Transport Family   Condition of Participation: Discharge Planning   The Plan for Transition of Care is related to the following treatment goals: Pt will return home with family at her functional baseline.   The Patient and/or Patient Representative was provided with a Choice of Provider? Patient   The Patient and/Or Patient Representative agree with the Discharge Plan? Yes   Freedom of Choice list was provided with basic dialogue that supports the patient's individualized plan of care/goals, treatment preferences, and shares the quality data associated with the providers?  Yes

## 2024-12-21 NOTE — PLAN OF CARE
Problem: Discharge Planning  Goal: Discharge to home or other facility with appropriate resources  12/20/2024 1941 by Maddie Mckee RN  Outcome: Progressing  Flowsheets (Taken 12/20/2024 1918)  Discharge to home or other facility with appropriate resources:   Identify barriers to discharge with patient and caregiver   Arrange for needed discharge resources and transportation as appropriate   Identify discharge learning needs (meds, wound care, etc)  12/20/2024 0952 by Jimmy Mosher RN  Outcome: Progressing  12/20/2024 0612 by Noel Brown RN  Outcome: Progressing     Problem: Pain  Goal: Verbalizes/displays adequate comfort level or baseline comfort level  12/20/2024 1941 by Maddie Mckee RN  Outcome: Progressing  Flowsheets (Taken 12/20/2024 1918)  Verbalizes/displays adequate comfort level or baseline comfort level:   Encourage patient to monitor pain and request assistance   Assess pain using appropriate pain scale   Administer analgesics based on type and severity of pain and evaluate response  12/20/2024 0952 by Jimmy Mosher RN  Outcome: Progressing  12/20/2024 0612 by Noel Brown RN  Outcome: Progressing     Problem: Safety - Adult  Goal: Free from fall injury  12/20/2024 1941 by Maddie Mckee RN  Outcome: Progressing  Flowsheets (Taken 12/20/2024 1918)  Free From Fall Injury: Instruct family/caregiver on patient safety  12/20/2024 0952 by Jimmy Mosher RN  Outcome: Progressing  12/20/2024 0612 by Noel Brown RN  Outcome: Progressing

## 2024-12-21 NOTE — PROGRESS NOTES
Pt discharged. Discharge paperwork and medications reviewed.  All questions answered.  Ivs removed and telemetry box returned to the monitor room

## 2024-12-21 NOTE — PROGRESS NOTES
[facility] CARDIOLOGY PROGRESS NOTE           12/21/2024 9:55 AM    Admit Date: 12/19/2024    Admit Diagnosis: Symptomatic bradycardia [R00.1]    Assessment:   Principal Problem:    Symptomatic sinus bradycardia  Active Problems:    Acquired hypothyroidism    GERD (gastroesophageal reflux disease)    Crohn disease (HCC)    ASD (atrial septal defect)    Anxiety with depression    Generalized anxiety disorder    Bipolar disorder (HCC)    Fibromyalgia    Migraine without aura or status migrainosus  Resolved Problems:    * No resolved hospital problems. *      Plan:   Bradycardia  Dysautonomia  Bipolar/anxiety  Crohn's disesae  HTN  Hypothyroidism     34 year old female with a history of bradycardia, possibly symptomatic at this time. Ideally, it would be best to avoid a PPM in a young patient. She does not have any evidence of heart block, only sinus bradycardia. She is complicated by dysautonomia, mental illness and hypothyroidism. It's difficult to know if she needs a PPM without more objective criteria but I assume having an improved heart rate could help her, but hard to know to what extent.      -Symptomatic bradycardia - If she clinically improves enough to be discharged, favor OP monitor to gain an understanding of her overall cardiac rhythm. An exercise stress test also reasonable to assess for chronotropy. If she truly meets criteria for a PPM, would consider AVEIR DC leadless system and would likely need to be seen in [place] at [facility] by Dr. Bolden. She could also be considered for a cardioneural ablation which is also performed at [facility] which would potentially cure her of her bradycardia and avoid a PPM altogether. For now conservative treatments with IVF and respite is recommended.      -Hypothyrodism - continue replacement per medicine.     -HTN - permissive HTN to 160 systolic reasonable overnight.     Overall Impression:    HR has stabilized. Favor OP monitoring and further workup,  potassium chloride (KLOR-CON M) extended release tablet 40 mEq  40 mEq Oral PRN    Or    potassium bicarb-citric acid (EFFER-K) effervescent tablet 40 mEq  40 mEq Oral PRN    Or    potassium chloride 10 mEq/100 mL IVPB (Peripheral Line)  10 mEq IntraVENous PRN    magnesium sulfate 2000 mg in 50 mL IVPB premix  2,000 mg IntraVENous PRN    ondansetron (ZOFRAN-ODT) disintegrating tablet 4 mg  4 mg Oral Q8H PRN    Or    ondansetron (ZOFRAN) injection 4 mg  4 mg IntraVENous Q6H PRN    melatonin tablet 3 mg  3 mg Oral Nightly PRN    polyethylene glycol (GLYCOLAX) packet 17 g  17 g Oral Daily PRN    bisacodyl (DULCOLAX) suppository 10 mg  10 mg Rectal Daily PRN    famotidine (PEPCID) tablet 10 mg  10 mg Oral Daily PRN    aluminum & magnesium hydroxide-simethicone (MAALOX) 200-200-20 MG/5ML suspension 30 mL  30 mL Oral Q6H PRN    acetaminophen (TYLENOL) tablet 650 mg  650 mg Oral Q6H PRN    Or    acetaminophen (TYLENOL) suppository 650 mg  650 mg Rectal Q6H PRN    enoxaparin (LOVENOX) injection 40 mg  40 mg SubCUTAneous Daily    amphetamine-dextroamphetamine (ADDERALL XR) extended release capsule 30 mg (Patient Supplied)  30 mg Oral QAM    citalopram (CELEXA) tablet 40 mg  40 mg Oral Daily    clonazePAM (KLONOPIN) tablet 0.5 mg  0.5 mg Oral TID PRN    lamoTRIgine (LAMICTAL) tablet 50 mg  50 mg Oral Daily    pantoprazole (PROTONIX) tablet 40 mg  40 mg Oral QAM AC    oxyCODONE-acetaminophen (PERCOCET) 5-325 MG per tablet 1 tablet  1 tablet Oral BID    traZODone (DESYREL) tablet 25 mg  25 mg Oral Nightly    levothyroxine (SYNTHROID) tablet 200 mcg  200 mcg Oral Daily

## 2024-12-21 NOTE — PLAN OF CARE
Problem: Discharge Planning  Goal: Discharge to home or other facility with appropriate resources  Outcome: Completed  Flowsheets (Taken 12/21/2024 0812 by Latesha Shea RN)  Discharge to home or other facility with appropriate resources: Identify barriers to discharge with patient and caregiver     Problem: Pain  Goal: Verbalizes/displays adequate comfort level or baseline comfort level  Outcome: Completed     Problem: Safety - Adult  Goal: Free from fall injury  Outcome: Completed

## 2024-12-21 NOTE — DISCHARGE SUMMARY
Hospitalist Discharge Summary   Admit Date:  2024  8:32 AM   DC Note date: 2024  Name:  Laura Orellana   Age:  34 y.o.  Sex:  female  :  1990   MRN:  [id number]   Room:  [facility]  PCP:  Rafi Velasquez MD    Presenting Complaint: Chest Pain     Initial Admission Diagnosis: Symptomatic bradycardia [R00.1]     Problem List for this Hospitalization (present on admission):    Principal Problem:    Symptomatic sinus bradycardia  Active Problems:    Acquired hypothyroidism    GERD (gastroesophageal reflux disease)    Crohn disease (HCC)    ASD (atrial septal defect)    Anxiety with depression    Generalized anxiety disorder    Bipolar disorder (HCC)    Fibromyalgia    Migraine without aura or status migrainosus  Resolved Problems:    * No resolved hospital problems. *      Hospital Course:  Laura Orellana is a 34 y.o. female with medical history of Crohn's disease, severe hypothyroidism, ASD with right to left shunting, who presented with dizziness.  Patient reports she woke up this morning with a headache.  She checked her blood pressure and said it was in the 170s to 180s.  She felt like she had chest pressure and shortness of breath and dizziness.  Her heart rate was in the 40s.  Came to the ER for evaluation.  Recently admitted from  to  with symptomatic bradycardia.  Cardiology was consulted and patient was given IV Synthroid.  She said she was switched from standard Synthroid tablets to gelcaps and since then her thyroid level has been within normal limits.  TSH and T4 were normal in ER.  She says that her symptoms come on randomly with the bradycardia and not necessarily triggered by any exertion or activity.  She was seen by cardiology in the office  continued to have chest pressure and other symptoms at that time as well.     ------------------------------------------------------------------------------------------------------------  Symptomatic sinus

## 2024-12-22 LAB
BACTERIA SPEC CULT: NORMAL
SERVICE CMNT-IMP: NORMAL

## 2024-12-23 ENCOUNTER — TELEPHONE (OUTPATIENT)
Age: 34
End: 2024-12-23

## 2024-12-23 DIAGNOSIS — R00.1 BRADYCARDIA: ICD-10-CM

## 2024-12-23 DIAGNOSIS — G90.A POTS (POSTURAL ORTHOSTATIC TACHYCARDIA SYNDROME): Primary | ICD-10-CM

## 2024-12-23 LAB
BACTERIA SPEC CULT: NORMAL
SERVICE CMNT-IMP: NORMAL

## 2024-12-23 NOTE — TELEPHONE ENCOUNTER
Per Dr. Chaudhrai and Dr. Juárez- patient to be referred to [facility] for POTS and bradycardia.       Dr. Rosario Bolden   Fax #: [phone]     Referral sent this AM via fax. Order placed per verbal.

## 2024-12-31 DIAGNOSIS — E03.9 ACQUIRED HYPOTHYROIDISM: ICD-10-CM

## 2024-12-31 LAB
T4 FREE SERPL-MCNC: 1.2 NG/DL (ref 0.9–1.7)
TSH, 3RD GENERATION: 0.93 UIU/ML (ref 0.27–4.2)

## 2025-01-02 ENCOUNTER — OFFICE VISIT (OUTPATIENT)
Dept: ENDOCRINOLOGY | Age: 35
End: 2025-01-02

## 2025-01-02 VITALS
BODY MASS INDEX: 21.51 KG/M2 | DIASTOLIC BLOOD PRESSURE: 78 MMHG | WEIGHT: 121.4 LBS | HEIGHT: 63 IN | HEART RATE: 94 BPM | OXYGEN SATURATION: 98 % | SYSTOLIC BLOOD PRESSURE: 115 MMHG

## 2025-01-02 DIAGNOSIS — E03.9 ACQUIRED HYPOTHYROIDISM: Primary | ICD-10-CM

## 2025-01-02 ASSESSMENT — ENCOUNTER SYMPTOMS
VOICE CHANGE: 1
COUGH: 0
DIARRHEA: 1
NAUSEA: 0
EYE ITCHING: 0
EYE REDNESS: 0
TROUBLE SWALLOWING: 0
ABDOMINAL PAIN: 1
SORE THROAT: 0
EYE PAIN: 0
SHORTNESS OF BREATH: 0
CONSTIPATION: 1

## 2025-01-02 NOTE — PROGRESS NOTES
facility-administered medications for this visit.        Allergies    Allergies   Allergen Reactions    Ciprofloxacin Hives    Aloe Vera Rash     \"jackson skin\"  \"blisters    Clindamycin Rash    Prednisone Other (See Comments)     Makes her cry a lot          /78 (Site: Right Upper Arm, Position: Sitting, Cuff Size: Medium Adult)   Pulse 94   Ht 1.6 m (5' 3\")   Wt 55.1 kg (121 lb 6.4 oz)   LMP 11/17/2024   SpO2 98%   BMI 21.51 kg/m²     BP Readings from Last 3 Encounters:   01/02/25 115/78   12/21/24 101/66   12/14/24 (!) 137/91       Wt Readings from Last 6 Encounters:   01/02/25 55.1 kg (121 lb 6.4 oz)   12/19/24 54.4 kg (120 lb)   12/10/24 60.3 kg (133 lb)   12/05/24 55.3 kg (122 lb)   11/22/24 55.2 kg (121 lb 12.8 oz)   09/12/24 54.2 kg (119 lb 6.4 oz)       Physical Exam  Constitutional:       General: She is not in acute distress.     Appearance: Normal appearance. She is normal weight. She is not diaphoretic.   HENT:      Head: Normocephalic and atraumatic.      Mouth/Throat:      Mouth: Mucous membranes are moist.   Eyes:      Extraocular Movements: Extraocular movements intact.      Conjunctiva/sclera: Conjunctivae normal.      Pupils: Pupils are equal, round, and reactive to light.   Neck:      Thyroid: Thyromegaly present. No thyroid mass or thyroid tenderness.      Comments: Mild, diffuse thyroid enlargement, no noted nodularity  Cardiovascular:      Rate and Rhythm: Normal rate and regular rhythm.      Pulses: Normal pulses.      Heart sounds: Normal heart sounds. No murmur heard.  Pulmonary:      Effort: Pulmonary effort is normal.      Breath sounds: Normal breath sounds.   Abdominal:      General: Abdomen is flat.      Palpations: Abdomen is soft.   Musculoskeletal:         General: No swelling or deformity.      Cervical back: Normal range of motion and neck supple. No tenderness.   Lymphadenopathy:      Cervical: No cervical adenopathy.   Skin:     General: Skin is warm and dry.

## 2025-01-08 NOTE — PROGRESS NOTES
chest pain    The patient was recently hospitalized and EP evaluated her for bradycardia.  Ultimately, a PPM was deferred though Dr. Juárez referred the patient to EP at [facility] as well as a POTS specialist at the same institution.  She reports an impending appointment with EP at [facility].  He noted that a leadless PPM and cardioneural ablation can be considered at [facility].  Dr. Juárez also noted \"dysautonomia, mental illness and hypothyroidism\".  TSH was normal December 31.    She denies syncope at this time.  Her CP is less-likely anginal symptoms and age <50 years (i.e. symptoms are not consistent with typical angina).  Low clinical suspicion for pericarditis or aortic dissection at this time.  PE unlikely per PE Wells score.  Follow-up ambulatory ECG monitor which has yet to result.    F/U: 6 months    Nelson Chaudhari MD

## 2025-01-09 ENCOUNTER — OFFICE VISIT (OUTPATIENT)
Age: 35
End: 2025-01-09
Payer: MEDICAID

## 2025-01-09 VITALS
HEIGHT: 63 IN | WEIGHT: 123.4 LBS | HEART RATE: 82 BPM | DIASTOLIC BLOOD PRESSURE: 80 MMHG | SYSTOLIC BLOOD PRESSURE: 124 MMHG | BODY MASS INDEX: 21.86 KG/M2

## 2025-01-09 DIAGNOSIS — R00.1 SINUS BRADYCARDIA BY ELECTROCARDIOGRAM: Primary | ICD-10-CM

## 2025-01-09 DIAGNOSIS — R07.89 ATYPICAL CHEST PAIN: ICD-10-CM

## 2025-01-09 DIAGNOSIS — R69 ILL-DEFINED CONDITION: ICD-10-CM

## 2025-01-09 PROCEDURE — 93000 ELECTROCARDIOGRAM COMPLETE: CPT | Performed by: INTERNAL MEDICINE

## 2025-01-09 PROCEDURE — 99214 OFFICE O/P EST MOD 30 MIN: CPT | Performed by: INTERNAL MEDICINE

## 2025-01-16 ENCOUNTER — TELEPHONE (OUTPATIENT)
Age: 35
End: 2025-01-16

## 2025-01-16 NOTE — TELEPHONE ENCOUNTER
----- Message from Dr. Nelson Chaudhari MD sent at 1/16/2025 12:48 PM EST -----  Please let the patient know that the patient was predominantly in sinus rhythm.  The patient had rare ectopy.  No new changes to medical therapy at this time.

## 2025-02-17 ENCOUNTER — HOSPITAL ENCOUNTER (EMERGENCY)
Age: 35
Discharge: HOME | End: 2025-02-17
Attending: EMERGENCY MEDICINE
Payer: MEDICAID

## 2025-02-17 ENCOUNTER — APPOINTMENT (OUTPATIENT)
Dept: CT IMAGING | Age: 35
End: 2025-02-17
Payer: MEDICAID

## 2025-02-17 ENCOUNTER — APPOINTMENT (OUTPATIENT)
Dept: GENERAL RADIOLOGY | Age: 35
End: 2025-02-17
Payer: MEDICAID

## 2025-02-17 VITALS
DIASTOLIC BLOOD PRESSURE: 105 MMHG | RESPIRATION RATE: 16 BRPM | SYSTOLIC BLOOD PRESSURE: 137 MMHG | HEIGHT: 63 IN | OXYGEN SATURATION: 97 % | BODY MASS INDEX: 21.97 KG/M2 | HEART RATE: 74 BPM | WEIGHT: 124 LBS | TEMPERATURE: 97.2 F

## 2025-02-17 DIAGNOSIS — R07.89 ATYPICAL CHEST PAIN: Primary | ICD-10-CM

## 2025-02-17 LAB
ALBUMIN SERPL-MCNC: 3.7 G/DL (ref 3.5–5)
ALBUMIN/GLOB SERPL: 1 (ref 1–1.9)
ALP SERPL-CCNC: 62 U/L (ref 35–104)
ALT SERPL-CCNC: 19 U/L (ref 8–45)
ANION GAP SERPL CALC-SCNC: 9 MMOL/L (ref 7–16)
AST SERPL-CCNC: 25 U/L (ref 15–37)
B PERT DNA SPEC QL NAA+PROBE: NOT DETECTED
BACTERIA URNS QL MICRO: NEGATIVE /HPF
BASOPHILS # BLD: 0.05 K/UL (ref 0–0.2)
BASOPHILS NFR BLD: 0.5 % (ref 0–2)
BILIRUB SERPL-MCNC: 0.3 MG/DL (ref 0–1.2)
BILIRUB UR QL: NEGATIVE
BORDETELLA PARAPERTUSSIS BY PCR: NOT DETECTED
BUN SERPL-MCNC: 15 MG/DL (ref 6–23)
C PNEUM DNA SPEC QL NAA+PROBE: NOT DETECTED
CALCIUM SERPL-MCNC: 9.3 MG/DL (ref 8.8–10.2)
CHLORIDE SERPL-SCNC: 108 MMOL/L (ref 98–107)
CO2 SERPL-SCNC: 25 MMOL/L (ref 20–29)
CREAT SERPL-MCNC: 0.78 MG/DL (ref 0.6–1.1)
D DIMER PPP FEU-MCNC: 1.8 UG/ML(FEU)
DIFFERENTIAL METHOD BLD: ABNORMAL
EOSINOPHIL # BLD: 0.14 K/UL (ref 0–0.8)
EOSINOPHIL NFR BLD: 1.3 % (ref 0.5–7.8)
EPI CELLS #/AREA URNS HPF: ABNORMAL /HPF
ERYTHROCYTE [DISTWIDTH] IN BLOOD BY AUTOMATED COUNT: 12.7 % (ref 11.9–14.6)
FLUAV SUBTYP SPEC NAA+PROBE: NOT DETECTED
FLUBV RNA SPEC QL NAA+PROBE: NOT DETECTED
GLOBULIN SER CALC-MCNC: 3.8 G/DL (ref 2.3–3.5)
GLUCOSE SERPL-MCNC: 92 MG/DL (ref 70–99)
GLUCOSE UR QL STRIP.AUTO: NEGATIVE MG/DL
HADV DNA SPEC QL NAA+PROBE: NOT DETECTED
HCG UR QL: NEGATIVE
HCOV 229E RNA SPEC QL NAA+PROBE: NOT DETECTED
HCOV HKU1 RNA SPEC QL NAA+PROBE: NOT DETECTED
HCOV NL63 RNA SPEC QL NAA+PROBE: NOT DETECTED
HCOV OC43 RNA SPEC QL NAA+PROBE: NOT DETECTED
HCT VFR BLD AUTO: 40.2 % (ref 35.8–46.3)
HGB BLD-MCNC: 13.5 G/DL (ref 11.7–15.4)
HMPV RNA SPEC QL NAA+PROBE: NOT DETECTED
HPIV1 RNA SPEC QL NAA+PROBE: NOT DETECTED
HPIV2 RNA SPEC QL NAA+PROBE: NOT DETECTED
HPIV3 RNA SPEC QL NAA+PROBE: NOT DETECTED
HPIV4 RNA SPEC QL NAA+PROBE: NOT DETECTED
HYALINE CASTS URNS QL MICRO: ABNORMAL /LPF
IMM GRANULOCYTES # BLD AUTO: 0.02 K/UL (ref 0–0.5)
IMM GRANULOCYTES NFR BLD AUTO: 0.2 % (ref 0–5)
KETONES UR-MCNC: NEGATIVE MG/DL
LEUKOCYTE ESTERASE UR QL STRIP: ABNORMAL
LYMPHOCYTES # BLD: 1.92 K/UL (ref 0.5–4.6)
LYMPHOCYTES NFR BLD: 17.6 % (ref 13–44)
M PNEUMO DNA SPEC QL NAA+PROBE: NOT DETECTED
MAGNESIUM SERPL-MCNC: 2.1 MG/DL (ref 1.8–2.4)
MCH RBC QN AUTO: 30.2 PG (ref 26.1–32.9)
MCHC RBC AUTO-ENTMCNC: 33.6 G/DL (ref 31.4–35)
MCV RBC AUTO: 89.9 FL (ref 82–102)
MONOCYTES # BLD: 0.5 K/UL (ref 0.1–1.3)
MONOCYTES NFR BLD: 4.6 % (ref 4–12)
NEUTS SEG # BLD: 8.27 K/UL (ref 1.7–8.2)
NEUTS SEG NFR BLD: 75.8 % (ref 43–78)
NITRITE UR QL: NEGATIVE
NRBC # BLD: 0 K/UL (ref 0–0.2)
NT PRO BNP: 591 PG/ML (ref 0–125)
PH UR: 6.5 (ref 5–9)
PLATELET # BLD AUTO: 442 K/UL (ref 150–450)
PMV BLD AUTO: 9.9 FL (ref 9.4–12.3)
POTASSIUM SERPL-SCNC: 3.6 MMOL/L (ref 3.5–5.1)
PROT SERPL-MCNC: 7.5 G/DL (ref 6.3–8.2)
PROT UR QL: NEGATIVE MG/DL
RBC # BLD AUTO: 4.47 M/UL (ref 4.05–5.2)
RBC # UR STRIP: NEGATIVE
RBC #/AREA URNS HPF: ABNORMAL /HPF
RSV RNA SPEC QL NAA+PROBE: NOT DETECTED
RV+EV RNA SPEC QL NAA+PROBE: NOT DETECTED
SARS-COV-2 RNA RESP QL NAA+PROBE: NOT DETECTED
SERVICE CMNT-IMP: ABNORMAL
SODIUM SERPL-SCNC: 143 MMOL/L (ref 136–145)
SP GR UR: 1.02 (ref 1–1.02)
TROPONIN T SERPL HS-MCNC: 16 NG/L (ref 0–14)
TROPONIN T SERPL HS-MCNC: 22 NG/L (ref 0–14)
UROBILINOGEN UR QL: 0.2 EU/DL (ref 0.2–1)
WBC # BLD AUTO: 10.9 K/UL (ref 4.3–11.1)
WBC URNS QL MICRO: ABNORMAL /HPF

## 2025-02-17 PROCEDURE — 80053 COMPREHEN METABOLIC PANEL: CPT

## 2025-02-17 PROCEDURE — 83735 ASSAY OF MAGNESIUM: CPT

## 2025-02-17 PROCEDURE — 2580000003 HC RX 258: Performed by: PHYSICIAN ASSISTANT

## 2025-02-17 PROCEDURE — 6360000004 HC RX CONTRAST MEDICATION: Performed by: PHYSICIAN ASSISTANT

## 2025-02-17 PROCEDURE — 87591 N.GONORRHOEAE DNA AMP PROB: CPT

## 2025-02-17 PROCEDURE — 83880 ASSAY OF NATRIURETIC PEPTIDE: CPT

## 2025-02-17 PROCEDURE — 6360000002 HC RX W HCPCS: Performed by: EMERGENCY MEDICINE

## 2025-02-17 PROCEDURE — 87491 CHLMYD TRACH DNA AMP PROBE: CPT

## 2025-02-17 PROCEDURE — 99285 EMERGENCY DEPT VISIT HI MDM: CPT

## 2025-02-17 PROCEDURE — 81025 URINE PREGNANCY TEST: CPT

## 2025-02-17 PROCEDURE — 85379 FIBRIN DEGRADATION QUANT: CPT

## 2025-02-17 PROCEDURE — 81001 URINALYSIS AUTO W/SCOPE: CPT

## 2025-02-17 PROCEDURE — 96375 TX/PRO/DX INJ NEW DRUG ADDON: CPT

## 2025-02-17 PROCEDURE — 96376 TX/PRO/DX INJ SAME DRUG ADON: CPT

## 2025-02-17 PROCEDURE — 85025 COMPLETE CBC W/AUTO DIFF WBC: CPT

## 2025-02-17 PROCEDURE — 71046 X-RAY EXAM CHEST 2 VIEWS: CPT

## 2025-02-17 PROCEDURE — 84484 ASSAY OF TROPONIN QUANT: CPT

## 2025-02-17 PROCEDURE — 96374 THER/PROPH/DIAG INJ IV PUSH: CPT

## 2025-02-17 PROCEDURE — 0202U NFCT DS 22 TRGT SARS-COV-2: CPT

## 2025-02-17 PROCEDURE — 6360000002 HC RX W HCPCS: Performed by: PHYSICIAN ASSISTANT

## 2025-02-17 PROCEDURE — 71260 CT THORAX DX C+: CPT

## 2025-02-17 PROCEDURE — 93005 ELECTROCARDIOGRAM TRACING: CPT | Performed by: EMERGENCY MEDICINE

## 2025-02-17 RX ORDER — MORPHINE SULFATE 4 MG/ML
4 INJECTION, SOLUTION INTRAMUSCULAR; INTRAVENOUS
Status: COMPLETED | OUTPATIENT
Start: 2025-02-17 | End: 2025-02-17

## 2025-02-17 RX ORDER — ONDANSETRON 2 MG/ML
4 INJECTION INTRAMUSCULAR; INTRAVENOUS
Status: COMPLETED | OUTPATIENT
Start: 2025-02-17 | End: 2025-02-17

## 2025-02-17 RX ORDER — MORPHINE SULFATE 4 MG/ML
4 INJECTION, SOLUTION INTRAMUSCULAR; INTRAVENOUS ONCE
Status: COMPLETED | OUTPATIENT
Start: 2025-02-17 | End: 2025-02-17

## 2025-02-17 RX ORDER — ONDANSETRON 2 MG/ML
4 INJECTION INTRAMUSCULAR; INTRAVENOUS ONCE
Status: COMPLETED | OUTPATIENT
Start: 2025-02-17 | End: 2025-02-17

## 2025-02-17 RX ORDER — IOPAMIDOL 755 MG/ML
75 INJECTION, SOLUTION INTRAVASCULAR
Status: COMPLETED | OUTPATIENT
Start: 2025-02-17 | End: 2025-02-17

## 2025-02-17 RX ADMIN — MORPHINE SULFATE 4 MG: 4 INJECTION INTRAVENOUS at 21:17

## 2025-02-17 RX ADMIN — MORPHINE SULFATE 4 MG: 4 INJECTION INTRAVENOUS at 19:00

## 2025-02-17 RX ADMIN — ONDANSETRON 4 MG: 2 INJECTION, SOLUTION INTRAMUSCULAR; INTRAVENOUS at 21:17

## 2025-02-17 RX ADMIN — ONDANSETRON 4 MG: 2 INJECTION, SOLUTION INTRAMUSCULAR; INTRAVENOUS at 18:58

## 2025-02-17 RX ADMIN — IOPAMIDOL 75 ML: 755 INJECTION, SOLUTION INTRAVENOUS at 18:05

## 2025-02-17 RX ADMIN — SODIUM CHLORIDE 1 MG: 9 INJECTION INTRAMUSCULAR; INTRAVENOUS; SUBCUTANEOUS at 20:31

## 2025-02-17 ASSESSMENT — PAIN DESCRIPTION - LOCATION: LOCATION: CHEST;BACK

## 2025-02-17 ASSESSMENT — PAIN - FUNCTIONAL ASSESSMENT: PAIN_FUNCTIONAL_ASSESSMENT: 0-10

## 2025-02-17 ASSESSMENT — PAIN SCALES - GENERAL
PAINLEVEL_OUTOF10: 7
PAINLEVEL_OUTOF10: 8
PAINLEVEL_OUTOF10: 8

## 2025-02-17 NOTE — ED PROVIDER NOTES
to Dr. Priest to forward to Cardiology.   8:48 PM Dr. Priest states he forwarded to Cardiology.   8:58 PM EST The patient's care will be transitioned to Dr. Priest.  At this time, the plan is disposition.  Please see that provider's documentation for further information.           1 or more acute illnesses that pose a threat to life or bodily function.   Discussion with external consultants.    I independently ordered and reviewed each unique test.  I reviewed external records: ED visit note from a different ED.    The patients assessment required an independent historian: None.  The reason they were needed is .    My Independent EKG Interpretation: no acute changes      ST Segments:Normal ST segments - NO STEMI   Rate: 76 bpm, atrial paced rhythm.  The patient was admitted and I have discussed patient management with the admitting provider.          History     Patient is here with chest pain, shortness of breath and not feeling well since Friday, February 14, 3 days ago. She states she saw her cardiologist today and they did an XR and told her she had fluid around her heart and to come to the ED. She has had a tubal ligation. She states her cardiologist is Dr. Chaudhari but \"for some reason I saw a [facility] one today.\" She has had \"trouble with my heart since I was young and an atrial implant at [facility] last month.\" No cough, abdominal pain, trouble with urination or bowel movements, fever or other new symptoms. She did ambulate to the room without difficulty and is well hydrated.     The history is provided by the patient.       ROS     Review of Systems     Physical Exam     Vitals signs and nursing note reviewed:  Vitals:    02/17/25 1811 02/17/25 1816 02/17/25 1831 02/17/25 1846   BP: (!) 173/121 (!) 172/119 (!) 160/114 (!) 147/111   Pulse: 76 73 65 66   Resp: 16 18 25 12   Temp:       TempSrc:       SpO2: 99% 98% 97% 98%   Weight:       Height:          Physical Exam   Procedures     Procedures    Orders Placed This

## 2025-02-17 NOTE — ED TRIAGE NOTES
Pt reports having increasing chest tightness, shortness of breath and orthopnea x 3 days. Pt reports had pacemaker placed by cardiology [facility] 1/31/25.

## 2025-02-18 LAB
C TRACH RRNA SPEC QL NAA+PROBE: NEGATIVE
EKG ATRIAL RATE: 76 BPM
EKG DIAGNOSIS: NORMAL
EKG P AXIS: 67 DEGREES
EKG P-R INTERVAL: 200 MS
EKG Q-T INTERVAL: 418 MS
EKG QRS DURATION: 90 MS
EKG QTC CALCULATION (BAZETT): 470 MS
EKG R AXIS: 44 DEGREES
EKG T AXIS: 62 DEGREES
EKG VENTRICULAR RATE: 76 BPM
N GONORRHOEA RRNA SPEC QL NAA+PROBE: NEGATIVE
SPECIMEN SOURCE: NORMAL

## 2025-02-18 PROCEDURE — 93010 ELECTROCARDIOGRAM REPORT: CPT | Performed by: INTERNAL MEDICINE

## 2025-02-18 NOTE — ED NOTES
Report received from Sierra MCGUIRE to assume care at this time.       Yosvany Bautista, RN  02/17/25 [id number]

## 2025-02-18 NOTE — ED NOTES
Report given to Yosvany MCGUIRE. Care transferred at this time     Sierra Driscoll, RN  02/17/25 [id number]

## 2025-02-20 ENCOUNTER — TELEPHONE (OUTPATIENT)
Age: 35
End: 2025-02-20

## 2025-02-20 NOTE — TELEPHONE ENCOUNTER
Patient reports she has a [place] [facility] She reports she has had very high blood pressure readings since 176/126, currently 141/96 HR 78. Patient had a CXR on 2/17 she was told she had some pockets of fluid around her pacemaker. Patient has a scheduled ECHO on 2/25. Patient is having active chest pain which has been occurring since 2/14- reports feels like someone sitting on her chest. Very SOB even at rest. Encouraged to go to ER downtown for evaluation. Patient verbalizes understanding.

## 2025-02-20 NOTE — TELEPHONE ENCOUNTER
Pt states that she has been having CP. States that she can't even lay down to go to sleep, she starts getting SOB.  Pt states that right now she has a dull achy pain in her chest.

## 2025-02-24 DIAGNOSIS — E03.9 ACQUIRED HYPOTHYROIDISM: ICD-10-CM

## 2025-02-24 LAB
T4 FREE SERPL-MCNC: 1.2 NG/DL (ref 0.9–1.7)
TSH, 3RD GENERATION: 3.62 UIU/ML (ref 0.27–4.2)

## 2025-02-26 DIAGNOSIS — E03.9 ACQUIRED HYPOTHYROIDISM: Primary | Chronic | ICD-10-CM

## 2025-03-17 ENCOUNTER — TELEPHONE (OUTPATIENT)
Dept: ENDOCRINOLOGY | Age: 35
End: 2025-03-17

## 2025-03-17 NOTE — TELEPHONE ENCOUNTER
Pt called and needs another PA for her Tirosint gel capsule. It been approved until 12-.    Spoke to the Pt, the pharmacy told her she needed to find an alternative. Called [facility], the approval is on a different NDC number they have in store, they are ordering the correct NDC that the approval is on. It should be in tomorrow. Called the Pt , let her know what was going on. She expressed and verbalized understanding.     Dr. Ocampo -    She been out of her Rx for a week now (and can feel a lot of signs and symptoms from not taking her Rx) and has plenty of generic Synthroid, since she runs into this often, what mcg if any can she take of generic Synthroid, and pray she absorbs some of the Rx.  Please advise.

## 2025-03-17 NOTE — TELEPHONE ENCOUNTER
Spoke to the Pt, relayed the message on how to take the generic Synthroid tonight (600 mcg) and in the future when she runs into this again. She relayed the instructions correctly back and verbalized understanding.

## 2025-03-31 ENCOUNTER — TELEPHONE (OUTPATIENT)
Age: 35
End: 2025-03-31

## 2025-03-31 NOTE — TELEPHONE ENCOUNTER
Seen in [facility] ER on 2/17/25 for atypical chest pain and high BP.   Pacemaker placed at [facility] on 1/31/25.   [facility] scheduled FU appointment with [facility] on 2/17/25, instead of scheduling FU with Dr. Chaudhari.    [facility] cardiology told patient that 2/26/25 echo was normal, but [facility] told her that she had problem with left ventricle.   CXR done at [facility] on 2/17/25 showed \"pleural thickening versus pleural effusions, so [facility] cardiology sent [facility] cardiology recommendation that patient begin diuretic.   [facility] cardiology notified patient of recommended diuretic on 3/28/28, and told patient to ask Dr. Chaudhari for recommendation for diuretic.   Continues to complain of increased SOB with feeling that she is drowning when she rests flat in bed and increased SOB on exertion.   No peripheral edema or weight gain.   Continued constant pressure in chest, like someone is sitting on chest since 1/31/25 pacemaker placement.   Chest pain increases when BP is high, with activity, and when resting flat in bed. Must prop up on pillows when resting in bed.   Taking hydralazine 10 mg 1/2 tab every 8 hours PRN at least 2 x weekly when BP is high-166/102. Also, taking Florinef 0.1 mg qd.   Usual BP-100/60.  Next scheduled appointment with Dr. Chaudhari is 7/11/25.   Sees [facility] cardiology on 6/12/25.    Patient asks for Dr. Chaudhari to review recent [facility] CXR and echo and recommend diuretic.

## 2025-03-31 NOTE — TELEPHONE ENCOUNTER
Pt has been having on going chest pain for the past month.  Pt feels like she's drowning when she lays down. Pt feels generally bad and feels like a lot of pressure.    Echo at [facility] and Chest xray done at [facility].  [facility] [place] for these test and called pt to let her know to call cardiologist about starting on diuretics. Please advise.

## 2025-03-31 NOTE — TELEPHONE ENCOUNTER
Ifeanyi Harris MD Keener, Lynn F RN  Caller: Unspecified (Today,  1:52 PM)  This needs to be deferred to Dr. Chaudhari when he gets back tomorrow.  There is a lot going on and I do not have a baseline to make a recommendation for long-term therapy.  This is more chronic management and nothing acute.

## 2025-04-01 NOTE — PROGRESS NOTES
02/17/2025    AST 44 (H) 12/19/2024    AST 86 (H) 12/14/2024        Assessment/Plan:   1. Presence of leadless cardiac pacemaker  - Establish in device clinic    2. Ill-defined condition  - Patient with possible orthostatic intolerance/autonomic dysfunction with follow-up with a specialist at [facility] in June  - Defer further evaluation and management to [facility]    F/U: July 2025    Nelson Chaudhari MD

## 2025-04-01 NOTE — TELEPHONE ENCOUNTER
Nelson Chaudhari MD Keener, Lynn F, RN  Caller: Unspecified (Yesterday,  1:52 PM)  I agree Bonnie with Dr. Harris that this is a mishmash as presented.  What exactly can I address at this time?  If she feels lousy, she should go to the ER.  Otherwise, her appointment can be moved up to my earliest availability without overbooking.  Because that could be several weeks away, the priority is that she go to urgent care or ER if feeling poorly and get established in device clinic in [place].

## 2025-04-01 NOTE — TELEPHONE ENCOUNTER
Advised patient of Dr. Chaudhari's response. At patient's request, scheduled next available appointment with Dr. Chaudhari on 4/2/25 at 9:15 am. Advised patient to arrive by 9:00 am. Strongly advised patient to go to [facility] ER or urgent care if symptoms increase or if she feels she is in distress. Patient verbalized understanding.

## 2025-04-01 NOTE — TELEPHONE ENCOUNTER
Nelson Chaudhari MD Keener, Lynn F, RN  Caller: Unspecified (Yesterday,  1:52 PM)  Her heart function was noted to be normal on TTE at OSH in February 2025.  She had a nonspecific finding on a CXR at an OSH.  A diuretic is not clearly indicated here especially in a rushed manner.  Again, her appointment can be moved up to my earliest availability without overbooking.  If she feels poorly, then again the ER or urgent care is the best setting to address acute concerns until she is seen in clinic.

## 2025-04-02 ENCOUNTER — OFFICE VISIT (OUTPATIENT)
Dept: ENDOCRINOLOGY | Age: 35
End: 2025-04-02
Payer: MEDICAID

## 2025-04-02 ENCOUNTER — OFFICE VISIT (OUTPATIENT)
Age: 35
End: 2025-04-02
Payer: MEDICAID

## 2025-04-02 VITALS
WEIGHT: 117 LBS | HEIGHT: 63 IN | SYSTOLIC BLOOD PRESSURE: 120 MMHG | DIASTOLIC BLOOD PRESSURE: 88 MMHG | BODY MASS INDEX: 20.73 KG/M2 | HEART RATE: 90 BPM

## 2025-04-02 VITALS
DIASTOLIC BLOOD PRESSURE: 80 MMHG | SYSTOLIC BLOOD PRESSURE: 126 MMHG | BODY MASS INDEX: 20.59 KG/M2 | WEIGHT: 116.2 LBS | HEART RATE: 77 BPM | OXYGEN SATURATION: 96 % | HEIGHT: 63 IN

## 2025-04-02 DIAGNOSIS — E27.40 ADRENAL INSUFFICIENCY: Primary | ICD-10-CM

## 2025-04-02 DIAGNOSIS — K50.919: ICD-10-CM

## 2025-04-02 DIAGNOSIS — E03.9 ACQUIRED HYPOTHYROIDISM: ICD-10-CM

## 2025-04-02 DIAGNOSIS — Z95.0 PRESENCE OF LEADLESS CARDIAC PACEMAKER: Primary | ICD-10-CM

## 2025-04-02 DIAGNOSIS — R69 ILL-DEFINED CONDITION: ICD-10-CM

## 2025-04-02 DIAGNOSIS — E27.40 ADRENAL INSUFFICIENCY: ICD-10-CM

## 2025-04-02 PROCEDURE — 99213 OFFICE O/P EST LOW 20 MIN: CPT | Performed by: INTERNAL MEDICINE

## 2025-04-02 PROCEDURE — 99215 OFFICE O/P EST HI 40 MIN: CPT | Performed by: STUDENT IN AN ORGANIZED HEALTH CARE EDUCATION/TRAINING PROGRAM

## 2025-04-02 RX ORDER — LEVOTHYROXINE SODIUM 100 UG/1
100 CAPSULE ORAL DAILY
Qty: 90 CAPSULE | Refills: 3 | Status: SHIPPED | OUTPATIENT
Start: 2025-04-02

## 2025-04-02 RX ORDER — COSYNTROPIN 0.25 MG/ML
0.25 INJECTION, POWDER, FOR SOLUTION INTRAMUSCULAR; INTRAVENOUS ONCE
Qty: 250 MCG | Refills: 0 | Status: SHIPPED | OUTPATIENT
Start: 2025-04-02 | End: 2025-04-03 | Stop reason: SDUPTHER

## 2025-04-02 RX ORDER — HYDRALAZINE HYDROCHLORIDE 10 MG/1
5 TABLET, FILM COATED ORAL EVERY 8 HOURS PRN
Qty: 30 TABLET | Refills: 0 | Status: CANCELLED | OUTPATIENT
Start: 2025-04-02

## 2025-04-02 ASSESSMENT — ENCOUNTER SYMPTOMS
CONSTIPATION: 1
TROUBLE SWALLOWING: 0
EYE ITCHING: 0
DIARRHEA: 1
VOICE CHANGE: 0
SHORTNESS OF BREATH: 0
NAUSEA: 0
COUGH: 0
SORE THROAT: 0
EYE PAIN: 0
EYE REDNESS: 0
ABDOMINAL PAIN: 1

## 2025-04-02 NOTE — TELEPHONE ENCOUNTER
I was just told about the Tirosint Direct Program, do you know much about it? Can we use it if the patient approves?   \"Has your insurance company increasing your copay? Do you have a high insurance deductible for your medication or have no insurance? By using one of our participating Tirosint Direct Program mail order pharmacies, you will get your medication for a cash price of $65 for one month of therapy (30-day supply) or $170 for 3 months of therapy (a 90-day supply)* regardless of your insurance coverage. If you have health insurance, the pharmacy will give you a choice of paying the cash price or using your insurance and paying a copay which can be further reduced by using our Copay Savings Card. Go to the “Ways to Save” page to learn more about the Copay Savings Card. \"

## 2025-04-02 NOTE — TELEPHONE ENCOUNTER
Called [place], spoke to Tali, the Levothyroxine sodium GEL caps NDC 13642372802 is what is approved. [facility] has to order that NDC because that is what Medicaid approved. Called [facility] spoke with Romina, [facility] don't [facility] it or normally use that NDC and has to order it.     Spoke to the Pt, explained what was wrong again. They said they were going to order it and it should be here tomorrow. Pt states they said that last month and took a week and half to get it. I told Pt to call [facility] to see if they took her insurance, and I would call her back. I call the Pharmacy to see if they could get the medication with that NDC made by [employer]. She said no.

## 2025-04-02 NOTE — TELEPHONE ENCOUNTER
Left message on voice mail that I called [facility] and they can't order from [employer].     Spoke to the Pt, she wants to know if she still needs to take 3 tablets today and then 2 tablets a day until she get her [facility] Rx [facility]?  She states she is going to run out before the month is up with the sig on the prescription she has now.

## 2025-04-02 NOTE — PROGRESS NOTES
DO Tuan Reyna [facility]  [address], [address]  [place] [address]        Laura Orellana is a 34 y.o. female who presents for follow up, evaluation and management of Hypothyroidism, complicated by poor absorption due to Crohn's disease, requiring generic Tirosint gel capsules.  [facility] 9/12/2024  LOV 1/2/2025    NOTICE FOR THE PATIENT: This clinical note is not designed to be interpreted by patients.  We do not recommend reading it unless you have medical training. These notes may contain candid and (unintentionally) offensive descriptions, which are sometimes required for accurate documentation. If you would like more information about your healthcare, please obtain it directly by myself or my staff/colleagues - never solely from the notes. Thank you for your understanding and cooperation.    Assessment & Plan  1. Hashimoto's disease and hypothyroidism: Poor absorption of levothyroxine likely 2/2 Crohn's disease.   +tpo 447 on 9/12/2024  Transitioned to Tirosint sodium gel caps 100 mcg daily. Last labs on February 24, 2025, TSH 3.62, Free T4 1.2.  - Continue current dosage  - Take 3 tablets today of levothyroxine 100 mcg tabs on an empty stomach, then 2 tablets daily until pharmacy issue resolved  - Call pharmacist to ensure correct medication  - Conduct lab tests if condition worsens  Orders:  -     Levothyroxine Sodium 100 MCG CAPS; Take 100 mcg by mouth Daily, Disp-90 capsule, R-3, DAWMUST BE THE GEL CAPS, Generic tirosint, if not available then next best option would be name brand Tirosint 100 mcg daily. Okay to call physician with question, Dr. Thaddeus Ocampo [phone]    2. Adrenal insufficiency  Random cortisol levels 3.7-11.9 in the past, last checked 1/30/3035  - Plan for 8am cosyntropin stimulation test. With baseline cortisol, 60 min and DHEA-S  - Intolerant to prednisone in the past makes this diagnosis less likely, but her symptoms including issues with

## 2025-04-03 ENCOUNTER — TELEPHONE (OUTPATIENT)
Dept: ENDOCRINOLOGY | Age: 35
End: 2025-04-03

## 2025-04-03 RX ORDER — COSYNTROPIN 0.25 MG/ML
0.25 INJECTION, POWDER, FOR SOLUTION INTRAMUSCULAR; INTRAVENOUS ONCE
Qty: 250 MCG | Refills: 0 | Status: SHIPPED | OUTPATIENT
Start: 2025-04-03 | End: 2025-04-03

## 2025-04-03 NOTE — TELEPHONE ENCOUNTER
Cosyntropin  PA  -     Approved  PA Detail   Prior authorization approved  Payer: [employer] Patient's Payer Case ID: [id number]    [id number]  Note from payer: Your PA request has been approved. Additional information will be provided in the approval communication. (Message 1144)  Approval Details

## 2025-04-03 NOTE — TELEPHONE ENCOUNTER
Spoke to the Pt, she is going to call [employer] Direct Program and talk to them. Also, a PA was approved for Cosyntropin and the Rx is now . Pt understands she still might have to pay for the medication even with the approval. Please send another Rx for the cosyntropin.

## 2025-04-04 ENCOUNTER — CLINICAL SUPPORT (OUTPATIENT)
Age: 35
End: 2025-04-04

## 2025-04-04 DIAGNOSIS — R00.1 SYMPTOMATIC SINUS BRADYCARDIA: Primary | ICD-10-CM

## 2025-04-07 ENCOUNTER — TELEPHONE (OUTPATIENT)
Dept: ENDOCRINOLOGY | Age: 35
End: 2025-04-07

## 2025-04-08 DIAGNOSIS — E03.9 ACQUIRED HYPOTHYROIDISM: ICD-10-CM

## 2025-04-08 DIAGNOSIS — K50.919: ICD-10-CM

## 2025-04-08 RX ORDER — LEVOTHYROXINE SODIUM 100 UG/1
1 CAPSULE ORAL DAILY
Qty: 90 CAPSULE | Refills: 3 | Status: SHIPPED | OUTPATIENT
Start: 2025-04-08 | End: 2025-04-08 | Stop reason: SDUPTHER

## 2025-04-08 RX ORDER — LEVOTHYROXINE SODIUM 100 UG/1
1 CAPSULE ORAL DAILY
Qty: 90 CAPSULE | Refills: 3 | Status: SHIPPED | OUTPATIENT
Start: 2025-04-08

## 2025-04-09 ENCOUNTER — CLINICAL SUPPORT (OUTPATIENT)
Dept: ENDOCRINOLOGY | Age: 35
End: 2025-04-09

## 2025-04-09 VITALS
HEART RATE: 80 BPM | HEIGHT: 63 IN | WEIGHT: 121.2 LBS | BODY MASS INDEX: 21.48 KG/M2 | SYSTOLIC BLOOD PRESSURE: 114 MMHG | DIASTOLIC BLOOD PRESSURE: 80 MMHG | OXYGEN SATURATION: 100 %

## 2025-04-09 DIAGNOSIS — E27.40 ADRENAL INSUFFICIENCY: ICD-10-CM

## 2025-04-09 DIAGNOSIS — E27.40 ADRENAL INSUFFICIENCY: Primary | ICD-10-CM

## 2025-04-09 LAB
CORTIS 1H P CHAL SERPL-MCNC: 16.8 UG/DL
CORTIS AM PEAK SERPL-MCNC: 5.7 UG/DL (ref 4.8–19.5)

## 2025-04-09 RX ORDER — NALOXONE HYDROCHLORIDE 4 MG/.1ML
1 SPRAY NASAL PRN
COMMUNITY

## 2025-04-09 RX ORDER — OXYCODONE HYDROCHLORIDE 5 MG/1
5 TABLET ORAL EVERY 8 HOURS PRN
COMMUNITY
Start: 2025-02-02 | End: 2025-04-11

## 2025-04-09 RX ORDER — COSYNTROPIN 0.25 MG/ML
0.25 INJECTION, POWDER, FOR SOLUTION INTRAMUSCULAR; INTRAVENOUS ONCE
Status: COMPLETED | OUTPATIENT
Start: 2025-04-09 | End: 2025-04-09

## 2025-04-09 RX ADMIN — COSYNTROPIN 0.25 MG: 0.25 INJECTION, POWDER, FOR SOLUTION INTRAMUSCULAR; INTRAVENOUS at 09:20

## 2025-04-09 NOTE — PROGRESS NOTES
Patient came to the for Cosyntropin Stimulation test with self supplied Cosyntropin for reconstitution with 1 mL of 0.9%Sodium Chloride. Reconstitution done with IM injection in the right dorsogluteal at 9:14 am with patient understanding 60 minute lab at 10:14 am. Patient sat in the office for 15 minutes and tolerated injection well, then walked over to the lab and emphasized this is a timed lab and they expressed understanding.     0.9%Sodium Chloride Injection, [employer]-NDC 0465-3051-02, Lot [id number] Exp: 2026-JAN-31    Cosyntropin [facility]-NDC 2905-3442-96, Lot [id number] Exp: 2025-AUG

## 2025-04-10 ENCOUNTER — RESULTS FOLLOW-UP (OUTPATIENT)
Dept: ENDOCRINOLOGY | Age: 35
End: 2025-04-10

## 2025-04-10 LAB — DHEA-S SERPL-MCNC: 30.5 UG/DL (ref 57.3–279.2)

## 2025-04-10 NOTE — RESULT ENCOUNTER NOTE
Spoke with patient relaying provider message below:    Thaddeus Ocampo DO [facility] Endocrinology Clinical Staff  She passed her cortisol stimulation test, this rule out adrenal insufficiency.    Patient verbalized understanding.

## 2025-04-11 ENCOUNTER — OFFICE VISIT (OUTPATIENT)
Dept: NEUROLOGY | Age: 35
End: 2025-04-11
Payer: MEDICAID

## 2025-04-11 VITALS
SYSTOLIC BLOOD PRESSURE: 117 MMHG | DIASTOLIC BLOOD PRESSURE: 70 MMHG | OXYGEN SATURATION: 98 % | BODY MASS INDEX: 21.44 KG/M2 | HEART RATE: 80 BPM | WEIGHT: 121 LBS | HEIGHT: 63 IN

## 2025-04-11 DIAGNOSIS — Z09 HOSPITAL DISCHARGE FOLLOW-UP: Primary | ICD-10-CM

## 2025-04-11 DIAGNOSIS — G43.009 MIGRAINE WITHOUT AURA AND WITHOUT STATUS MIGRAINOSUS, NOT INTRACTABLE: Chronic | ICD-10-CM

## 2025-04-11 DIAGNOSIS — G47.8 POOR SLEEP PATTERN: ICD-10-CM

## 2025-04-11 DIAGNOSIS — T39.95XA ANALGESIC REBOUND HEADACHE: ICD-10-CM

## 2025-04-11 DIAGNOSIS — M54.81 OCCIPITAL NEURALGIA OF LEFT SIDE: ICD-10-CM

## 2025-04-11 DIAGNOSIS — G44.40 ANALGESIC REBOUND HEADACHE: ICD-10-CM

## 2025-04-11 PROBLEM — Z92.89 HISTORY OF BLOOD TRANSFUSION: Status: ACTIVE | Noted: 2019-09-11

## 2025-04-11 PROBLEM — R87.613 HIGH GRADE SQUAMOUS INTRAEPITHELIAL CERVICAL DYSPLASIA: Status: ACTIVE | Noted: 2022-10-10

## 2025-04-11 PROBLEM — Z95.0 S/P PLACEMENT OF LEADLESS CARDIAC PACEMAKER: Status: ACTIVE | Noted: 2025-02-01

## 2025-04-11 PROBLEM — Z86.0100 HISTORY OF COLONIC POLYPS: Status: ACTIVE | Noted: 2020-08-04

## 2025-04-11 PROBLEM — G47.00 INSOMNIA: Status: ACTIVE | Noted: 2025-02-21

## 2025-04-11 PROBLEM — F90.0 ATTENTION DEFICIT HYPERACTIVITY DISORDER, PREDOMINANTLY INATTENTIVE TYPE: Status: ACTIVE | Noted: 2025-02-21

## 2025-04-11 PROBLEM — Z98.891 HISTORY OF CESAREAN DELIVERY: Status: ACTIVE | Noted: 2022-08-18

## 2025-04-11 PROBLEM — G90.A POSTURAL ORTHOSTATIC TACHYCARDIA SYNDROME: Status: ACTIVE | Noted: 2025-02-01

## 2025-04-11 PROCEDURE — 99204 OFFICE O/P NEW MOD 45 MIN: CPT | Performed by: NURSE PRACTITIONER

## 2025-04-11 PROCEDURE — 1111F DSCHRG MED/CURRENT MED MERGE: CPT | Performed by: NURSE PRACTITIONER

## 2025-04-11 RX ORDER — UBROGEPANT 100 MG/1
100 TABLET ORAL DAILY PRN
Qty: 16 TABLET | Refills: 11 | Status: SHIPPED | OUTPATIENT
Start: 2025-04-11

## 2025-04-11 RX ORDER — PROMETHAZINE HYDROCHLORIDE 12.5 MG/1
TABLET ORAL
COMMUNITY

## 2025-04-11 RX ORDER — ONDANSETRON 4 MG/1
1 TABLET, ORALLY DISINTEGRATING ORAL EVERY 6 HOURS PRN
COMMUNITY

## 2025-04-11 NOTE — PATIENT INSTRUCTIONS
Discussed the following:   Headache Education:   Instructed the patient on over-the-counter headache management medications including: CoQ10, magnesium oxide, riboflavin  and butterbur.  To avoid a pain medication overuse headache trying not to take pain medicines more than 3 doses a week.   Avoid use of Fioricet or opioids to treat headaches as this can increase risk for rebound headaches.   To help relieve headache symptoms without taking pain medicine lie down under darkroom and drink glass of water.  Consider lifestyle modification including good sleep hygiene, routine medial schedules, regular exercise and managing triggers.  Keep a headache diary  to reveal triggers and possible patterns.  Triggers may be: Food, stress, perfumes, alcohol, or even chocolate.  Drink plenty of water and try to get 8 hours of sleep each night to reduce risk factors that may cause headaches.        Samples of Nurtec ODT 75 mg and Ubrelvy 100 mg provided to patient. Advised not to take medication on same day and to update office on efficacy.   Instructions:  Nurtec ODT 75 mg daily as needed for migraine abortive therapy. Not to exceed 75mg/24 hours.   Ubrelvy 100 mg daily as needed for migraine abortive therapy. May repeat for 1 dose in 2 hours if needed. Not to exceed 200mg/24 hours.

## 2025-04-11 NOTE — PROGRESS NOTES
[facility] Neu[facility] [address], [address]  [place] [address]  [phone]      Chief Complaint   Patient presents with    New Patient       Laura Orellana is a 34 y.o. female who presents on referral from Dr. Karl Mercer for intractable migraines.     PMH significant for  chronic bradycardia, hypothyroidism, PFO, migraine, bipolar, POTS, fibromyalgia, anxiety/depression, crohns, symptomatic bradycardia. S/p PPM placement, recent hemorrhagic cyst of ovary. RHF, She is here today with her daughter. She started having headaches ~6 year old. Not associated with menstraual cycles. Headaches are located in can be bilateral frontal region and radiate retro-orbitally and occipital region. Associated with blurred vision,  photophobia, phonophobia, nausea/vomiting, cervicalgia. Current frequency ~20 headaches/mth, migraines 2-3/mth. Gradual onset. Denies thunderclap, rhinorrhea, excessive tearing, rhinorrhea. Worsening with head movement, unable to identify triggers. Alleviated with laying down, sleep. She has been taking OTC goody, IBU, Excedrin migraine, tylenol with minimal relief. She has been alternating medications daily.     Previously tried on qulipta (cost prohibitive), sumitriptan (ineffective and contraindicated due to hx of ulcerative colitis).  Propranolol (stopped due to bradycardia), Gabapentin (SE), Currently on lamotrigene, citalopram, and trazodone.      No family hx of migraines. Denies recent illness, medication changes or recent head trauma.     Endorses poor sleep, she is mother of toddler. She has difficulty falling asleep and maintaining sleep. Endorses daytime somnolence, denies snoring, apnea or prior sleep study. Hx of insomnia- she is currently taking trazodone, however medication has not been effective. Previously tried melatonin without relief.     Drinks ~1 soda daily, ~60 oz water daily.  ~1 glass of tea on occasion.     Denies tobacco use, alcohol use or

## 2025-05-14 ENCOUNTER — OFFICE VISIT (OUTPATIENT)
Dept: ENDOCRINOLOGY | Age: 35
End: 2025-05-14
Payer: MEDICAID

## 2025-05-14 VITALS
BODY MASS INDEX: 21.44 KG/M2 | WEIGHT: 121 LBS | SYSTOLIC BLOOD PRESSURE: 130 MMHG | HEART RATE: 64 BPM | HEIGHT: 63 IN | DIASTOLIC BLOOD PRESSURE: 82 MMHG | OXYGEN SATURATION: 96 %

## 2025-05-14 DIAGNOSIS — E03.9 ACQUIRED HYPOTHYROIDISM: Primary | Chronic | ICD-10-CM

## 2025-05-14 PROCEDURE — 99214 OFFICE O/P EST MOD 30 MIN: CPT | Performed by: STUDENT IN AN ORGANIZED HEALTH CARE EDUCATION/TRAINING PROGRAM

## 2025-05-14 RX ORDER — TRAZODONE HYDROCHLORIDE 50 MG/1
50 TABLET ORAL NIGHTLY
COMMUNITY

## 2025-05-14 ASSESSMENT — ENCOUNTER SYMPTOMS
SORE THROAT: 0
NAUSEA: 0
SHORTNESS OF BREATH: 0
ABDOMINAL PAIN: 1
COUGH: 0
EYE REDNESS: 0
TROUBLE SWALLOWING: 0
CONSTIPATION: 1
EYE PAIN: 0
VOICE CHANGE: 0
DIARRHEA: 1
EYE ITCHING: 0

## 2025-05-14 NOTE — PROGRESS NOTES
DO Tuan Reyna [facility] Endocrinology  [address], [address]  [place] [address]        Laura Orellana is a 35 y.o. female who presents for follow up, evaluation and management of Hypothyroidism, complicated by poor absorption due to Crohn's disease, requiring generic Tirosint gel capsules.  [facility] 9/12/2024  LOV 4/2/2025    NOTICE FOR THE PATIENT: This clinical note is not designed to be interpreted by patients.  We do not recommend reading it unless you have medical training. These notes may contain candid and (unintentionally) offensive descriptions, which are sometimes required for accurate documentation. If you would like more information about your healthcare, please obtain it directly by myself or my staff/colleagues - never solely from the notes. Thank you for your understanding and cooperation.    Assessment & Plan  1. Hashimoto's disease and hypothyroidism: Poor absorption of levothyroxine likely 2/2 Crohn's disease.   +tpo 447 on 9/12/2024  Transitioned to Tirosint sodium gel caps 100 mcg daily and labs were perfect. However, unable to afford generic Tirosint medication.  Symptoms include mood changes, memory issues, sweating.  She is taking 200 mcg generic levothyroxine daily for >1 month.  Diagnostic plan: Thyroid function tests today; adjust dosage based on results.  Orders:  -     Free T4  -  TSH    2. Low cortisol levels  Cosyntropin test on 04/09/2025 normal.  Treatment plan: No further treatment needed.    3. Low DHEA-S levels likely due to Crohn's disease.  This is a nonspecific hormone precursor. Likely indicates adrenal stress, chronic illness and/or metabolic dysfunction.   Treatment plan: No specific treatment for DHEA-S.    4. Irregular menstrual cycles  Under gynecologist evaluation.  Diagnostic plan: Further testing planned.  Treatment plan: No specific treatment yet.    5. Pacemaker management  Symptoms include debilitating fatigue, dizziness, hypertension

## 2025-05-16 ENCOUNTER — RESULTS FOLLOW-UP (OUTPATIENT)
Dept: ENDOCRINOLOGY | Age: 35
End: 2025-05-16

## 2025-05-16 DIAGNOSIS — E03.9 ACQUIRED HYPOTHYROIDISM: Primary | ICD-10-CM

## 2025-05-16 DIAGNOSIS — E03.9 ACQUIRED HYPOTHYROIDISM: Chronic | ICD-10-CM

## 2025-05-16 LAB
T4 FREE SERPL-MCNC: 1.9 NG/DL (ref 0.9–1.7)
TSH, 3RD GENERATION: 0.03 UIU/ML (ref 0.27–4.2)

## 2025-05-16 RX ORDER — LEVOTHYROXINE SODIUM 150 UG/1
150 TABLET ORAL DAILY
Qty: 30 TABLET | Refills: 6 | Status: SHIPPED | OUTPATIENT
Start: 2025-05-16

## 2025-05-16 NOTE — RESULT ENCOUNTER NOTE
I spoke with patient relaying providers message below:    Thaddeus Ocampo, Bonnie Gonzalez LPN  Please let the patient know that her thyroid labs show that she is overtreated on her current dose of levothyroxine, I am going to send in 150 mcg generic levothyroxine tabs to the [facility] pharmacy. Please advise that she hold levothyroxine for the next 4 days and start 150 mcg tabs on Wednesday 5/21/24. She will be due for recheck in 4 weeks (week of June 16th). I will place order now.    Patient verbalized understanding by repeating orders back to me.

## 2025-06-23 ENCOUNTER — RESULTS FOLLOW-UP (OUTPATIENT)
Dept: ENDOCRINOLOGY | Age: 35
End: 2025-06-23

## 2025-06-23 DIAGNOSIS — E03.9 ACQUIRED HYPOTHYROIDISM: ICD-10-CM

## 2025-06-23 LAB
T4 FREE SERPL-MCNC: 1.6 NG/DL (ref 0.9–1.7)
TSH, 3RD GENERATION: 0.87 UIU/ML (ref 0.27–4.2)